# Patient Record
Sex: FEMALE | Race: BLACK OR AFRICAN AMERICAN | NOT HISPANIC OR LATINO | Employment: UNEMPLOYED | URBAN - METROPOLITAN AREA
[De-identification: names, ages, dates, MRNs, and addresses within clinical notes are randomized per-mention and may not be internally consistent; named-entity substitution may affect disease eponyms.]

---

## 2019-01-30 ENCOUNTER — HOSPITAL ENCOUNTER (EMERGENCY)
Facility: HOSPITAL | Age: 54
Discharge: HOME | End: 2019-01-30
Attending: EMERGENCY MEDICINE
Payer: MEDICARE

## 2019-01-30 VITALS
WEIGHT: 185 LBS | DIASTOLIC BLOOD PRESSURE: 87 MMHG | HEART RATE: 116 BPM | RESPIRATION RATE: 21 BRPM | OXYGEN SATURATION: 97 % | SYSTOLIC BLOOD PRESSURE: 163 MMHG | TEMPERATURE: 100 F

## 2019-01-30 DIAGNOSIS — R05.9 COUGH: ICD-10-CM

## 2019-01-30 DIAGNOSIS — J40 BRONCHITIS: Primary | ICD-10-CM

## 2019-01-30 DIAGNOSIS — R07.9 CHEST PAIN: ICD-10-CM

## 2019-01-30 PROCEDURE — 93010 EKG 12-LEAD: ICD-10-PCS | Mod: ,,, | Performed by: INTERNAL MEDICINE

## 2019-01-30 PROCEDURE — 93010 ELECTROCARDIOGRAM REPORT: CPT | Mod: ,,, | Performed by: INTERNAL MEDICINE

## 2019-01-30 PROCEDURE — 93005 ELECTROCARDIOGRAM TRACING: CPT | Mod: ER

## 2019-01-30 PROCEDURE — 99285 EMERGENCY DEPT VISIT HI MDM: CPT | Mod: 25,ER

## 2019-01-30 PROCEDURE — 63600175 PHARM REV CODE 636 W HCPCS: Mod: ER | Performed by: EMERGENCY MEDICINE

## 2019-01-30 PROCEDURE — 25000242 PHARM REV CODE 250 ALT 637 W/ HCPCS: Mod: ER

## 2019-01-30 PROCEDURE — 25000242 PHARM REV CODE 250 ALT 637 W/ HCPCS: Mod: ER | Performed by: EMERGENCY MEDICINE

## 2019-01-30 PROCEDURE — 94760 N-INVAS EAR/PLS OXIMETRY 1: CPT | Mod: ER

## 2019-01-30 PROCEDURE — 94640 AIRWAY INHALATION TREATMENT: CPT | Mod: ER

## 2019-01-30 RX ORDER — PREDNISONE 10 MG/1
10 TABLET ORAL DAILY
Qty: 5 TABLET | Refills: 0 | Status: SHIPPED | OUTPATIENT
Start: 2019-01-30 | End: 2019-02-04

## 2019-01-30 RX ORDER — IPRATROPIUM BROMIDE AND ALBUTEROL SULFATE 2.5; .5 MG/3ML; MG/3ML
SOLUTION RESPIRATORY (INHALATION)
Status: COMPLETED
Start: 2019-01-30 | End: 2019-01-30

## 2019-01-30 RX ORDER — PREDNISONE 20 MG/1
60 TABLET ORAL
Status: COMPLETED | OUTPATIENT
Start: 2019-01-30 | End: 2019-01-30

## 2019-01-30 RX ORDER — IPRATROPIUM BROMIDE AND ALBUTEROL SULFATE 2.5; .5 MG/3ML; MG/3ML
3 SOLUTION RESPIRATORY (INHALATION)
Status: COMPLETED | OUTPATIENT
Start: 2019-01-30 | End: 2019-01-30

## 2019-01-30 RX ORDER — ALBUTEROL SULFATE 90 UG/1
2 AEROSOL, METERED RESPIRATORY (INHALATION) EVERY 6 HOURS PRN
Qty: 6.7 G | Refills: 0 | Status: SHIPPED | OUTPATIENT
Start: 2019-01-30 | End: 2021-06-16 | Stop reason: SDUPTHER

## 2019-01-30 RX ADMIN — IPRATROPIUM BROMIDE AND ALBUTEROL SULFATE 3 ML: .5; 3 SOLUTION RESPIRATORY (INHALATION) at 10:01

## 2019-01-30 RX ADMIN — PREDNISONE 60 MG: 20 TABLET ORAL at 11:01

## 2019-01-31 NOTE — ED PROVIDER NOTES
Encounter Date: 2019    SCRIBE #1 NOTE: I, Khai Jeffries, am scribing for, and in the presence of,  Dr. Caban . I have scribed the following portions of the note - Other sections scribed: HPI, ROS, PE.       History     Chief Complaint   Patient presents with    Chest Pain     Anterior chest pain that radiates to back, shortness of breath with cough onset yesterday. Worsened with lying supine., pt states she been out of BP meds x 1 month, unknown med     This is a 53 year old female complaining of anterior chest pain radiating to back. Patient is experiencing SOB and productive cough. Coughing exacerbates chest pain and abdominal pain. Symptoms worsen when she is lying supine. Patient reports being out of blood pressure meds x 1 month. Patient is a smoker and has no history of bronchitis. She admits to taking breathing treatments at home.       The history is provided by the patient. No  was used.     Review of patient's allergies indicates:  Allergies not on file  Past Medical History:   Diagnosis Date    Hypertension      Past Surgical History:   Procedure Laterality Date     SECTION       No family history on file.  Social History     Tobacco Use    Smoking status: Current Every Day Smoker     Types: Cigarettes   Substance Use Topics    Alcohol use: Yes    Drug use: No     Review of Systems   Constitutional: Positive for fever (subjective).   HENT: Negative.  Negative for sore throat.    Eyes: Negative.    Respiratory: Positive for cough and shortness of breath.    Cardiovascular: Positive for chest pain.   Gastrointestinal: Positive for abdominal pain (due to cough ). Negative for nausea and vomiting.   Endocrine: Negative.    Genitourinary: Negative.  Negative for dysuria.   Musculoskeletal: Negative.  Negative for myalgias.   Skin: Negative.  Negative for rash.   Allergic/Immunologic: Negative.    Neurological: Negative.  Negative for headaches.   Hematological:  Negative.  Negative for adenopathy.   Psychiatric/Behavioral: Negative.  Negative for behavioral problems.   All other systems reviewed and are negative.      Physical Exam     Initial Vitals [01/30/19 2141]   BP Pulse Resp Temp SpO2   (!) 177/103 (!) 114 18 99.9 °F (37.7 °C) (!) 92 %      MAP       --         Physical Exam    Nursing note and vitals reviewed.  Constitutional: She appears well-developed and well-nourished.   HENT:   Head: Normocephalic and atraumatic.   Right Ear: External ear normal.   Left Ear: External ear normal.   Nose: Nose normal.   Eyes: Conjunctivae are normal.   Neck: Normal range of motion. Neck supple.   Cardiovascular: Normal rate and intact distal pulses.   Pulmonary/Chest: Effort normal. No respiratory distress. She has wheezes in the right upper field, the right middle field, the right lower field, the left upper field, the left middle field and the left lower field.   Abdominal: Soft. There is no tenderness.   Musculoskeletal: Normal range of motion.   Neurological: She is alert and oriented to person, place, and time.   Skin: Skin is warm and dry. Capillary refill takes less than 2 seconds.   Psychiatric: She has a normal mood and affect. Her behavior is normal.         ED Course   Procedures  Labs Reviewed - No data to display       Imaging Results          X-Ray Chest AP Portable (Final result)  Result time 01/30/19 22:05:51    Final result by Ho Velarde MD (01/30/19 22:05:51)                 Impression:      No acute findings in the chest.      Electronically signed by: Ho Velarde MD  Date:    01/30/2019  Time:    22:05             Narrative:    EXAMINATION:  XR CHEST AP PORTABLE    CLINICAL HISTORY:  Cough    TECHNIQUE:  Single frontal view of the chest was performed.    COMPARISON:  None    FINDINGS:  No consolidation, pleural effusion or pneumothorax.    Cardiomediastinal silhouette is unremarkable.                                 Medical Decision Making:   ED  Management:  Markedly improved with good air movement and almost complete resolution of wheezing with 3 treatments and steroids.            Scribe Attestation:   Scribe #1: I performed the above scribed service and the documentation accurately describes the services I performed. I attest to the accuracy of the note.    This document was produced by a scribe under my direction and in my presence. I agree with the content of the note and have made any necessary edits.     Mj Caban MD    01/30/2019 10:47 PM           Clinical Impression:     1. Bronchitis    2. Chest pain    3. Cough                                   Mj Caban MD  01/30/19 2247       Mj Caban MD  01/30/19 2243

## 2019-11-19 ENCOUNTER — HOSPITAL ENCOUNTER (OUTPATIENT)
Facility: HOSPITAL | Age: 54
Discharge: HOME | End: 2019-11-20
Attending: EMERGENCY MEDICINE | Admitting: INTERNAL MEDICINE
Payer: MEDICARE

## 2019-11-19 DIAGNOSIS — I10 ESSENTIAL HYPERTENSION: ICD-10-CM

## 2019-11-19 DIAGNOSIS — E87.6 HYPOKALEMIA: ICD-10-CM

## 2019-11-19 DIAGNOSIS — R07.9 CHEST PAIN: ICD-10-CM

## 2019-11-19 DIAGNOSIS — R94.31 PROLONGED Q-T INTERVAL ON ECG: ICD-10-CM

## 2019-11-19 DIAGNOSIS — R07.9 CHEST PAIN, UNSPECIFIED TYPE: Primary | ICD-10-CM

## 2019-11-19 LAB
ALBUMIN SERPL-MCNC: 3.9 G/DL (ref 3.3–5.5)
ALP SERPL-CCNC: 85 U/L (ref 42–141)
BILIRUB SERPL-MCNC: 0.5 MG/DL (ref 0.2–1.6)
BUN SERPL-MCNC: 5 MG/DL (ref 7–22)
CALCIUM SERPL-MCNC: 9.5 MG/DL (ref 8–10.3)
CHLORIDE SERPL-SCNC: 104 MMOL/L (ref 98–108)
CREAT SERPL-MCNC: 0.7 MG/DL (ref 0.6–1.2)
GLUCOSE SERPL-MCNC: 101 MG/DL (ref 73–118)
POC ALT (SGPT): 14 U/L (ref 10–47)
POC AST (SGOT): 24 U/L (ref 11–38)
POC B-TYPE NATRIURETIC PEPTIDE: 9.5 PG/ML (ref 0–100)
POC CARDIAC TROPONIN I: 0 NG/ML
POC D-DI: 222 NG/ML (ref 0–450)
POC TCO2: 27 MMOL/L (ref 18–33)
POTASSIUM BLD-SCNC: 2.8 MMOL/L (ref 3.6–5.1)
PROTEIN, POC: 7.5 G/DL (ref 6.4–8.1)
SAMPLE: NORMAL
SODIUM BLD-SCNC: 143 MMOL/L (ref 128–145)

## 2019-11-19 PROCEDURE — 85025 COMPLETE CBC W/AUTO DIFF WBC: CPT | Mod: ER

## 2019-11-19 PROCEDURE — 83880 ASSAY OF NATRIURETIC PEPTIDE: CPT | Mod: ER

## 2019-11-19 PROCEDURE — G0378 HOSPITAL OBSERVATION PER HR: HCPCS

## 2019-11-19 PROCEDURE — 84484 ASSAY OF TROPONIN QUANT: CPT | Mod: ER

## 2019-11-19 PROCEDURE — 96365 THER/PROPH/DIAG IV INF INIT: CPT | Mod: ER

## 2019-11-19 PROCEDURE — 85379 FIBRIN DEGRADATION QUANT: CPT | Mod: ER

## 2019-11-19 PROCEDURE — 93005 ELECTROCARDIOGRAM TRACING: CPT | Mod: ER

## 2019-11-19 PROCEDURE — 25000003 PHARM REV CODE 250: Mod: ER | Performed by: EMERGENCY MEDICINE

## 2019-11-19 PROCEDURE — 96375 TX/PRO/DX INJ NEW DRUG ADDON: CPT | Mod: ER

## 2019-11-19 PROCEDURE — 99285 EMERGENCY DEPT VISIT HI MDM: CPT | Mod: 25,ER

## 2019-11-19 PROCEDURE — 80053 COMPREHEN METABOLIC PANEL: CPT | Mod: ER

## 2019-11-19 PROCEDURE — 93010 EKG 12-LEAD: ICD-10-PCS | Mod: ,,, | Performed by: INTERNAL MEDICINE

## 2019-11-19 PROCEDURE — 63600175 PHARM REV CODE 636 W HCPCS: Mod: ER | Performed by: EMERGENCY MEDICINE

## 2019-11-19 PROCEDURE — 93010 ELECTROCARDIOGRAM REPORT: CPT | Mod: ,,, | Performed by: INTERNAL MEDICINE

## 2019-11-19 RX ORDER — SODIUM CHLORIDE 9 MG/ML
1000 INJECTION, SOLUTION INTRAVENOUS
Status: COMPLETED | OUTPATIENT
Start: 2019-11-19 | End: 2019-11-19

## 2019-11-19 RX ORDER — POTASSIUM CHLORIDE 7.45 MG/ML
10 INJECTION INTRAVENOUS
Status: COMPLETED | OUTPATIENT
Start: 2019-11-19 | End: 2019-11-19

## 2019-11-19 RX ORDER — ASPIRIN 325 MG
325 TABLET ORAL
Status: COMPLETED | OUTPATIENT
Start: 2019-11-19 | End: 2019-11-19

## 2019-11-19 RX ORDER — POTASSIUM CHLORIDE 20 MEQ/1
40 TABLET, EXTENDED RELEASE ORAL
Status: COMPLETED | OUTPATIENT
Start: 2019-11-19 | End: 2019-11-19

## 2019-11-19 RX ORDER — MAGNESIUM SULFATE 1 G/100ML
1 INJECTION INTRAVENOUS
Status: COMPLETED | OUTPATIENT
Start: 2019-11-19 | End: 2019-11-19

## 2019-11-19 RX ORDER — DIAZEPAM 5 MG/1
5 TABLET ORAL
Status: COMPLETED | OUTPATIENT
Start: 2019-11-19 | End: 2019-11-19

## 2019-11-19 RX ORDER — SODIUM CHLORIDE 0.9 % (FLUSH) 0.9 %
10 SYRINGE (ML) INJECTION
Status: DISCONTINUED | OUTPATIENT
Start: 2019-11-19 | End: 2019-11-20 | Stop reason: HOSPADM

## 2019-11-19 RX ADMIN — DIAZEPAM 5 MG: 5 TABLET ORAL at 08:11

## 2019-11-19 RX ADMIN — ASPIRIN 325 MG ORAL TABLET 325 MG: 325 PILL ORAL at 08:11

## 2019-11-19 RX ADMIN — POTASSIUM CHLORIDE 40 MEQ: 1500 TABLET, EXTENDED RELEASE ORAL at 08:11

## 2019-11-19 RX ADMIN — MAGNESIUM SULFATE IN DEXTROSE 1 G: 10 INJECTION, SOLUTION INTRAVENOUS at 09:11

## 2019-11-19 RX ADMIN — POTASSIUM CHLORIDE 10 MEQ: 7.46 INJECTION, SOLUTION INTRAVENOUS at 08:11

## 2019-11-19 RX ADMIN — SODIUM CHLORIDE 1000 ML: 0.9 INJECTION, SOLUTION INTRAVENOUS at 08:11

## 2019-11-20 VITALS
OXYGEN SATURATION: 98 % | BODY MASS INDEX: 33 KG/M2 | RESPIRATION RATE: 18 BRPM | DIASTOLIC BLOOD PRESSURE: 84 MMHG | HEART RATE: 78 BPM | HEIGHT: 64 IN | WEIGHT: 193.31 LBS | TEMPERATURE: 99 F | SYSTOLIC BLOOD PRESSURE: 148 MMHG

## 2019-11-20 PROBLEM — I10 ESSENTIAL HYPERTENSION: Status: ACTIVE | Noted: 2019-11-20

## 2019-11-20 PROBLEM — R94.31 PROLONGED Q-T INTERVAL ON ECG: Status: ACTIVE | Noted: 2019-11-20

## 2019-11-20 LAB
ANION GAP SERPL CALC-SCNC: 14 MMOL/L (ref 8–16)
ASCENDING AORTA: 3.03 CM
AV INDEX (PROSTH): 0.99
AV MEAN GRADIENT: 4 MMHG
AV PEAK GRADIENT: 7 MMHG
AV VALVE AREA: 4.28 CM2
AV VELOCITY RATIO: 0.88
BSA FOR ECHO PROCEDURE: 1.99 M2
BUN SERPL-MCNC: 5 MG/DL (ref 6–20)
CALCIUM SERPL-MCNC: 8.7 MG/DL (ref 8.7–10.5)
CHLORIDE SERPL-SCNC: 108 MMOL/L (ref 95–110)
CO2 SERPL-SCNC: 21 MMOL/L (ref 23–29)
CREAT SERPL-MCNC: 0.7 MG/DL (ref 0.5–1.4)
CV ECHO LV RWT: 0.47 CM
CV STRESS BASE HR: 68 BPM
DIASTOLIC BLOOD PRESSURE: 95 MMHG
DOP CALC AO PEAK VEL: 1.32 M/S
DOP CALC AO VTI: 28.41 CM
DOP CALC LVOT AREA: 4.3 CM2
DOP CALC LVOT DIAMETER: 2.35 CM
DOP CALC LVOT PEAK VEL: 1.16 M/S
DOP CALC LVOT STROKE VOLUME: 121.6 CM3
DOP CALCLVOT PEAK VEL VTI: 28.05 CM
E WAVE DECELERATION TIME: 197.24 MSEC
E/A RATIO: 1.15
E/E' RATIO: 13.38 M/S
ECHO LV POSTERIOR WALL: 1.18 CM (ref 0.6–1.1)
EST. GFR  (AFRICAN AMERICAN): >60 ML/MIN/1.73 M^2
EST. GFR  (NON AFRICAN AMERICAN): >60 ML/MIN/1.73 M^2
FRACTIONAL SHORTENING: 33 % (ref 28–44)
GLUCOSE SERPL-MCNC: 111 MG/DL (ref 70–110)
INTERVENTRICULAR SEPTUM: 1.44 CM (ref 0.6–1.1)
IVRT: 0.1 MSEC
LA MAJOR: 4.25 CM
LA MINOR: 4.63 CM
LA WIDTH: 4.33 CM
LEFT ATRIUM SIZE: 3.58 CM
LEFT ATRIUM VOLUME INDEX: 30.3 ML/M2
LEFT ATRIUM VOLUME: 58.4 CM3
LEFT INTERNAL DIMENSION IN SYSTOLE: 3.38 CM (ref 2.1–4)
LEFT VENTRICLE DIASTOLIC VOLUME INDEX: 63.44 ML/M2
LEFT VENTRICLE DIASTOLIC VOLUME: 122.31 ML
LEFT VENTRICLE MASS INDEX: 140 G/M2
LEFT VENTRICLE SYSTOLIC VOLUME INDEX: 24.3 ML/M2
LEFT VENTRICLE SYSTOLIC VOLUME: 46.89 ML
LEFT VENTRICULAR INTERNAL DIMENSION IN DIASTOLE: 5.07 CM (ref 3.5–6)
LEFT VENTRICULAR MASS: 270.53 G
LV LATERAL E/E' RATIO: 11.89 M/S
LV SEPTAL E/E' RATIO: 15.29 M/S
MV PEAK A VEL: 0.93 M/S
MV PEAK E VEL: 1.07 M/S
NUC STRESS EJECTION FRACTION: 73 %
OHS CV CPX 85 PERCENT MAX PREDICTED HEART RATE MALE: 135
OHS CV CPX MAX PREDICTED HEART RATE: 158
OHS CV CPX PATIENT IS FEMALE: 1
OHS CV CPX PATIENT IS MALE: 0
OHS CV CPX PEAK DIASTOLIC BLOOD PRESSURE: 93 MMHG
OHS CV CPX PEAK HEAR RATE: 115 BPM
OHS CV CPX PEAK RATE PRESSURE PRODUCT: NORMAL
OHS CV CPX PEAK SYSTOLIC BLOOD PRESSURE: 161 MMHG
OHS CV CPX PERCENT MAX PREDICTED HEART RATE ACHIEVED: 73
OHS CV CPX RATE PRESSURE PRODUCT PRESENTING: 9588
PISA TR MAX VEL: 2.78 M/S
POTASSIUM SERPL-SCNC: 2.8 MMOL/L (ref 3.5–5.1)
POTASSIUM SERPL-SCNC: 3 MMOL/L (ref 3.5–5.1)
RA MAJOR: 3.86 CM
RA WIDTH: 3.5 CM
RIGHT VENTRICULAR END-DIASTOLIC DIMENSION: 2.95 CM
RV TISSUE DOPPLER FREE WALL SYSTOLIC VELOCITY 1 (APICAL 4 CHAMBER VIEW): 12.54 CM/S
SINUS: 3.11 CM
SODIUM SERPL-SCNC: 143 MMOL/L (ref 136–145)
STJ: 2.62 CM
STRESS ECHO TARGET HR: 141 BPM
SYSTOLIC BLOOD PRESSURE: 141 MMHG
TDI LATERAL: 0.09 M/S
TDI SEPTAL: 0.07 M/S
TDI: 0.08 M/S
TR MAX PG: 31 MMHG
TRICUSPID ANNULAR PLANE SYSTOLIC EXCURSION: 2.54 CM
TROPONIN I SERPL DL<=0.01 NG/ML-MCNC: 0.01 NG/ML (ref 0–0.03)
TROPONIN I SERPL DL<=0.01 NG/ML-MCNC: 0.01 NG/ML (ref 0–0.03)

## 2019-11-20 PROCEDURE — 99220 PR INITIAL OBSERVATION CARE,LEVL III: ICD-10-PCS | Mod: 25,,, | Performed by: INTERNAL MEDICINE

## 2019-11-20 PROCEDURE — 94761 N-INVAS EAR/PLS OXIMETRY MLT: CPT

## 2019-11-20 PROCEDURE — 84132 ASSAY OF SERUM POTASSIUM: CPT

## 2019-11-20 PROCEDURE — G0378 HOSPITAL OBSERVATION PER HR: HCPCS

## 2019-11-20 PROCEDURE — 25000003 PHARM REV CODE 250: Performed by: NURSE PRACTITIONER

## 2019-11-20 PROCEDURE — 63600175 PHARM REV CODE 636 W HCPCS: Performed by: INTERNAL MEDICINE

## 2019-11-20 PROCEDURE — 36415 COLL VENOUS BLD VENIPUNCTURE: CPT

## 2019-11-20 PROCEDURE — 80048 BASIC METABOLIC PNL TOTAL CA: CPT

## 2019-11-20 PROCEDURE — 84484 ASSAY OF TROPONIN QUANT: CPT | Mod: 91

## 2019-11-20 PROCEDURE — 99220 PR INITIAL OBSERVATION CARE,LEVL III: CPT | Mod: 25,,, | Performed by: INTERNAL MEDICINE

## 2019-11-20 RX ORDER — AMLODIPINE BESYLATE 10 MG/1
10 TABLET ORAL DAILY
Qty: 30 TABLET | Refills: 6 | Status: SHIPPED | OUTPATIENT
Start: 2019-11-20 | End: 2020-11-19

## 2019-11-20 RX ORDER — POTASSIUM CHLORIDE 20 MEQ/1
40 TABLET, EXTENDED RELEASE ORAL DAILY
Status: DISCONTINUED | OUTPATIENT
Start: 2019-11-20 | End: 2019-11-20 | Stop reason: HOSPADM

## 2019-11-20 RX ORDER — POTASSIUM CHLORIDE 20 MEQ/1
40 TABLET, EXTENDED RELEASE ORAL 2 TIMES DAILY
Qty: 8 TABLET | Refills: 0 | Status: SHIPPED | OUTPATIENT
Start: 2019-11-20 | End: 2019-11-22

## 2019-11-20 RX ORDER — REGADENOSON 0.08 MG/ML
0.4 INJECTION, SOLUTION INTRAVENOUS ONCE
Status: COMPLETED | OUTPATIENT
Start: 2019-11-20 | End: 2019-11-20

## 2019-11-20 RX ADMIN — REGADENOSON 0.4 MG: 0.08 INJECTION, SOLUTION INTRAVENOUS at 11:11

## 2019-11-20 RX ADMIN — POTASSIUM CHLORIDE 40 MEQ: 1500 TABLET, EXTENDED RELEASE ORAL at 04:11

## 2019-11-20 NOTE — ASSESSMENT & PLAN NOTE
Atypical chest pain with risk factors and change in EKG. Cardiology consulted who ordered NST - keep NPO, continue cardiac monitoring and trend trops

## 2019-11-20 NOTE — H&P
"[facility] Hospital Medicine  History & Physical    Patient Name: Jayde Kinney  MRN: [id number]  Admission Date: 2019  Attending Physician: Beatris Blankenship MD   Primary Care Provider: To Obtain Unable         Patient information was obtained from patient and ER records.     Subjective:     Principal Problem:Chest pain    Chief Complaint:   Chief Complaint   Patient presents with    Chest Pain     pt found out her sister , went home and drank a pint of whiskey and one beer, now complaining of chest pain.         HPI: Jayde Kinney is a 53 y.o. AAF with PMHx including HTN, DMII, and cigarette smoker. She presented for evaluation of acute onset of non-radiating chest stabbing-like pain that started when she was crying about her sisters grim condition on life support. She reports that she did not have NTG in ED. She denies fever/chills, NVD, long trips, HA, hormone use, recent trauma, or focal deficits.      Requested that patient call family member to get her mediation names but she stated "NO" says she doen't know the names of her medications.    Past Medical History:   Diagnosis Date    Arthritis     Hypertension        Past Surgical History:   Procedure Laterality Date     SECTION      FRACTURE SURGERY      Left leg        Review of patient's allergies indicates:  No Known Allergies    No current facility-administered medications on file prior to encounter.      Current Outpatient Medications on File Prior to Encounter   Medication Sig    albuterol (PROVENTIL/VENTOLIN HFA) 90 mcg/actuation inhaler Inhale 2 puffs into the lungs every 6 (six) hours as needed for Wheezing. Rescue     Family History     None        Tobacco Use    Smoking status: Current Every Day Smoker     Packs/day: 0.50     Types: Cigarettes    Smokeless tobacco: Never Used   Substance and Sexual Activity    Alcohol use: Yes     Comment: occasionally    Drug use: No    Sexual activity: Not on " file     Review of Systems   Constitutional: Negative for appetite change, chills, diaphoresis and fever.   HENT: Negative for congestion, hearing loss, sore throat, tinnitus and trouble swallowing.    Eyes: Negative for photophobia, discharge, itching and visual disturbance.   Respiratory: Negative for apnea, cough, wheezing and stridor.    Cardiovascular: Positive for chest pain. Negative for palpitations and leg swelling.   Gastrointestinal: Negative for abdominal distention, abdominal pain, blood in stool, constipation, diarrhea and nausea.   Endocrine: Negative for polydipsia, polyphagia and polyuria.   Genitourinary: Negative for difficulty urinating, dysuria, flank pain and frequency.   Musculoskeletal: Negative for arthralgias, joint swelling and neck stiffness.   Skin: Negative for color change, rash and wound.   Neurological: Negative for dizziness, tremors, seizures, light-headedness, numbness and headaches.   Hematological: Negative for adenopathy.   Psychiatric/Behavioral: Negative for hallucinations and self-injury.     Objective:     Vital Signs (Most Recent):  Temp: 98.9 °F (37.2 °C) (11/20/19 1351)  Pulse: 78 (11/20/19 1351)  Resp: 18 (11/20/19 1351)  BP: (!) 148/84 (11/20/19 1351)  SpO2: 98 % (11/20/19 1351) Vital Signs (24h Range):  Temp:  [98 °F (36.7 °C)-98.9 °F (37.2 °C)] 98.9 °F (37.2 °C)  Pulse:  [70-79] 78  Resp:  [17-20] 18  SpO2:  [96 %-100 %] 98 %  BP: (107-178)/() 148/84     Weight: 87.7 kg (193 lb 5.5 oz)  Body mass index is 33.19 kg/m².    Physical Exam   Constitutional: She is oriented to person, place, and time. She appears well-developed and well-nourished. She is cooperative.   HENT:   Head: Normocephalic and atraumatic.   Eyes: Pupils are equal, round, and reactive to light. Conjunctivae, EOM and lids are normal.   Neck: Normal range of motion and full passive range of motion without pain. Neck supple. No JVD present. No edema present. No thyroid mass present.    Cardiovascular: S1 normal, S2 normal and intact distal pulses.   No murmur heard.  Pulmonary/Chest: Effort normal.   Abdominal: Soft. Bowel sounds are normal. She exhibits no distension and no abdominal bruit. There is no splenomegaly or hepatomegaly. There is no tenderness. There is no CVA tenderness.   Musculoskeletal: Normal range of motion.   Lymphadenopathy:     She has no cervical adenopathy.     She has no axillary adenopathy.   Neurological: She is alert and oriented to person, place, and time. She has normal reflexes. She displays no tremor. She displays no seizure activity.   Skin: Skin is warm, dry and intact.   Psychiatric: She has a normal mood and affect. Her speech is normal. Thought content normal. Cognition and memory are normal.         CRANIAL NERVES     CN III, IV, VI   Pupils are equal, round, and reactive to light.  Extraocular motions are normal.        Significant Labs: All pertinent labs within the past 24 hours have been reviewed.    Significant Imaging: I have reviewed all pertinent imaging results/findings within the past 24 hours.    Assessment/Plan:     * Chest pain  Atypical chest pain with risk factors and change in EKG. Cardiology consulted who ordered NST - keep NPO, continue cardiac monitoring and trend trops      Essential hypertension  States that she does not know her medications and will not call family  Amlodipine for BP  Monitor closely      Prolonged Q-T interval on ECG  See above        VTE Risk Mitigation (From admission, onward)         Ordered     IP VTE LOW RISK PATIENT  Once      11/19/19 2350     Place CORI hose  Until discontinued      11/19/19 2350     Place sequential compression device  Until discontinued      11/19/19 2350                   LEANDRO De Los Santos, FNP-C  Hospitalist - Department of Hospital Medicine  [address], [place] [address]  Office [phone]; Pager [phone]

## 2019-11-20 NOTE — SUBJECTIVE & OBJECTIVE
Past Medical History:   Diagnosis Date    Arthritis     Hypertension        Past Surgical History:   Procedure Laterality Date     SECTION      FRACTURE SURGERY      Left leg        Review of patient's allergies indicates:  No Known Allergies    No current facility-administered medications on file prior to encounter.      Current Outpatient Medications on File Prior to Encounter   Medication Sig    albuterol (PROVENTIL/VENTOLIN HFA) 90 mcg/actuation inhaler Inhale 2 puffs into the lungs every 6 (six) hours as needed for Wheezing. Rescue     Family History     None        Tobacco Use    Smoking status: Current Every Day Smoker     Packs/day: 0.50     Types: Cigarettes    Smokeless tobacco: Never Used   Substance and Sexual Activity    Alcohol use: Yes     Comment: occasionally    Drug use: No    Sexual activity: Not on file     Review of Systems   Constitutional: Negative for appetite change, chills, diaphoresis and fever.   HENT: Negative for congestion, hearing loss, sore throat, tinnitus and trouble swallowing.    Eyes: Negative for photophobia, discharge, itching and visual disturbance.   Respiratory: Negative for apnea, cough, wheezing and stridor.    Cardiovascular: Positive for chest pain. Negative for palpitations and leg swelling.   Gastrointestinal: Negative for abdominal distention, abdominal pain, blood in stool, constipation, diarrhea and nausea.   Endocrine: Negative for polydipsia, polyphagia and polyuria.   Genitourinary: Negative for difficulty urinating, dysuria, flank pain and frequency.   Musculoskeletal: Negative for arthralgias, joint swelling and neck stiffness.   Skin: Negative for color change, rash and wound.   Neurological: Negative for dizziness, tremors, seizures, light-headedness, numbness and headaches.   Hematological: Negative for adenopathy.   Psychiatric/Behavioral: Negative for hallucinations and self-injury.     Objective:     Vital Signs (Most Recent):  Temp:  98.9 °F (37.2 °C) (11/20/19 1351)  Pulse: 78 (11/20/19 1351)  Resp: 18 (11/20/19 1351)  BP: (!) 148/84 (11/20/19 1351)  SpO2: 98 % (11/20/19 1351) Vital Signs (24h Range):  Temp:  [98 °F (36.7 °C)-98.9 °F (37.2 °C)] 98.9 °F (37.2 °C)  Pulse:  [70-79] 78  Resp:  [17-20] 18  SpO2:  [96 %-100 %] 98 %  BP: (107-178)/() 148/84     Weight: 87.7 kg (193 lb 5.5 oz)  Body mass index is 33.19 kg/m².    Physical Exam   Constitutional: She is oriented to person, place, and time. She appears well-developed and well-nourished. She is cooperative.   HENT:   Head: Normocephalic and atraumatic.   Eyes: Pupils are equal, round, and reactive to light. Conjunctivae, EOM and lids are normal.   Neck: Normal range of motion and full passive range of motion without pain. Neck supple. No JVD present. No edema present. No thyroid mass present.   Cardiovascular: S1 normal, S2 normal and intact distal pulses.   No murmur heard.  Pulmonary/Chest: Effort normal.   Abdominal: Soft. Bowel sounds are normal. She exhibits no distension and no abdominal bruit. There is no splenomegaly or hepatomegaly. There is no tenderness. There is no CVA tenderness.   Musculoskeletal: Normal range of motion.   Lymphadenopathy:     She has no cervical adenopathy.     She has no axillary adenopathy.   Neurological: She is alert and oriented to person, place, and time. She has normal reflexes. She displays no tremor. She displays no seizure activity.   Skin: Skin is warm, dry and intact.   Psychiatric: She has a normal mood and affect. Her speech is normal. Thought content normal. Cognition and memory are normal.         CRANIAL NERVES     CN III, IV, VI   Pupils are equal, round, and reactive to light.  Extraocular motions are normal.        Significant Labs: All pertinent labs within the past 24 hours have been reviewed.    Significant Imaging: I have reviewed all pertinent imaging results/findings within the past 24 hours.

## 2019-11-20 NOTE — PLAN OF CARE
Problem: Fall Injury Risk  Goal: Absence of Fall and Fall-Related Injury  Outcome: Ongoing, Progressing  Intervention: Identify and Manage Contributors to Fall Injury Risk  Flowsheets (Taken 11/20/2019 1133)  Self-Care Promotion: independence encouraged; BADL personal objects within reach  Medication Review/Management: medications reviewed  Intervention: Promote Injury-Free Environment  Flowsheets (Taken 11/20/2019 1133)  Safety Promotion/Fall Prevention: assistive device/personal item within reach; bed alarm set; commode/urinal/bedpan at bedside; Fall Risk reviewed with patient/family; lighting adjusted; medications reviewed; nonskid shoes/socks when out of bed; room near unit station; side rails raised x 2; instructed to call staff for mobility  Environmental Safety Modification: assistive device/personal items within reach; clutter free environment maintained; lighting adjusted; room organization consistent; room near unit station

## 2019-11-20 NOTE — CONSULTS
[facility]  Cardiology  Consult Note    Patient Name: Jayde Kinney  MRN: [id number]  Admission Date: 2019  Hospital Length of Stay: 0 days  Code Status: Full Code   Attending Provider: Beatris Blankenship MD   Consulting Provider: Raj Harris MD  Primary Care Physician: To Obtain Unable  Principal Problem:<principal problem not specified>    Patient information was obtained from patient and ER records.     Consults  Subjective:     Chief Complaint:  CP     HPI:      53 year old female complaining of acute intermittent chest pain beginning today around 1800. Chest pain can be described as pressure like. Reports mild SOB and heart palpitation during episodes of chest pain. Patient has been under a lot of stress lately due to sick family member. Denies PMHX of stroke or MI. Patient is a smoker. Patient does not have PCP.    Currently CP free  Troponin negative x 2  EKG NSR PRWP QTc 512  Denies prior CAD    Past Medical History:   Diagnosis Date    Arthritis     Hypertension        Past Surgical History:   Procedure Laterality Date     SECTION      FRACTURE SURGERY      Left leg        Review of patient's allergies indicates:  No Known Allergies    No current facility-administered medications on file prior to encounter.      Current Outpatient Medications on File Prior to Encounter   Medication Sig    albuterol (PROVENTIL/VENTOLIN HFA) 90 mcg/actuation inhaler Inhale 2 puffs into the lungs every 6 (six) hours as needed for Wheezing. Rescue     Family History     None        Tobacco Use    Smoking status: Current Every Day Smoker     Packs/day: 0.50     Types: Cigarettes    Smokeless tobacco: Never Used   Substance and Sexual Activity    Alcohol use: Yes     Comment: occasionally    Drug use: No    Sexual activity: Not on file     Review of Systems   Constitution: Negative for decreased appetite.   HENT: Negative for ear discharge.    Eyes: Negative for blurred vision.    Respiratory: Negative for hemoptysis.    Endocrine: Negative for polyphagia.   Hematologic/Lymphatic: Negative for adenopathy.   Skin: Negative for color change.   Musculoskeletal: Negative for joint swelling.   Genitourinary: Negative for bladder incontinence.   Neurological: Negative for brief paralysis.   Psychiatric/Behavioral: Negative for hallucinations.   Allergic/Immunologic: Negative for hives.     Objective:     Vital Signs (Most Recent):  Temp: 98 °F (36.7 °C) (11/20/19 0727)  Pulse: 76 (11/20/19 0727)  Resp: 18 (11/20/19 0727)  BP: 132/72 (11/20/19 0853)  SpO2: 96 % (11/20/19 0727) Vital Signs (24h Range):  Temp:  [98 °F (36.7 °C)-98.5 °F (36.9 °C)] 98 °F (36.7 °C)  Pulse:  [70-79] 76  Resp:  [17-20] 18  SpO2:  [96 %-100 %] 96 %  BP: (107-178)/() 132/72     Weight: 87.7 kg (193 lb 5.5 oz)  Body mass index is 33.19 kg/m².    SpO2: 96 %  O2 Device (Oxygen Therapy): room air      Intake/Output Summary (Last 24 hours) at 11/20/2019 0942  Last data filed at 11/20/2019 0016  Gross per 24 hour   Intake 1440 ml   Output 0 ml   Net 1440 ml       Lines/Drains/Airways     Peripheral Intravenous Line                 Peripheral IV - Single Lumen 11/19/19 1946 18 G Right Antecubital less than 1 day                Physical Exam   Constitutional: She is oriented to person, place, and time. She appears well-developed and well-nourished.   HENT:   Head: Normocephalic and atraumatic.   Eyes: Pupils are equal, round, and reactive to light. Conjunctivae are normal.   Neck: Normal range of motion. Neck supple.   Cardiovascular: Normal rate, normal heart sounds and intact distal pulses.   Pulmonary/Chest: Effort normal and breath sounds normal.   Abdominal: Soft. Bowel sounds are normal.   Musculoskeletal: Normal range of motion.   Neurological: She is alert and oriented to person, place, and time.   Skin: Skin is warm and dry.       Significant Labs: All pertinent lab results from the last 24 hours have been  reviewed.    Significant Imaging: Echocardiogram: 2D echo with color flow doppler: No results found for this or any previous visit.    Assessment and Plan:     Essential hypertension  BP labile - will monitor    Prolonged Q-T interval on ECG  Agree with IV magnesium    Chest pain  Ruled out for MI. EKG with PRWP - suspect related to lead position. Echo and stress test today - ok for d/c if negative for ischemia        VTE Risk Mitigation (From admission, onward)         Ordered     IP VTE LOW RISK PATIENT  Once      11/19/19 2350     Place CORI hose  Until discontinued      11/19/19 2350     Place sequential compression device  Until discontinued      11/19/19 2350                Thank you for your consult. I will follow-up with patient. Please contact us if you have any additional questions.    Raj Harris MD  Cardiology   [facility]

## 2019-11-20 NOTE — NURSING TRANSFER
Nursing Transfer Note      11/20/2019     Transfer From: [facility] ED To: [facility]    Transfer via EMS stretcher    Transfer with cardiac monitoring    Transported by [employer] transport    Medicines sent: none    Chart send with patient: Yes    Notified: family    Patient reassessed at 0000 on November 20, 2019    Upon arrival to floor EMS placed stretcher at bedside. Patient up from stretcher and assisted to bed by EMS personnel. Nurse applied cardiac monitoring. Vital signs obtained and found in flow sheets with complete patient assessment. Skin dry and flaky but intact with a 18g RAC PIV noted saline locked. No complaints of pain or signs of respiratory distress at this time. Plan to trend troponins, monitor and manage pain, and maintain npo status after midnight for possible cardiac testing in the am. Patient updated on plan of care and verbalized understanding. Call light in reach and patient instructed to inform the nurse if anything is needed. Patient stable and will continue to be monitored.

## 2019-11-20 NOTE — PLAN OF CARE
11/20/19 1437   Final Note   Anticipated Discharge Disposition Home   Hospital Follow Up  Appt(s) scheduled? Yes   Discharge plans and expectations educations in teach back method with documentation complete? Yes   Right Care Referral Info   Post Acute Recommendation No Care   pts nurse Oma notified that the pt can d/c from CM standpoint.

## 2019-11-20 NOTE — PLAN OF CARE
11/20/19 1302   Discharge Assessment   Assessment Type Discharge Planning Assessment   Assessment information obtained from? Medical Record   Prior to hospitilization cognitive status: Alert/Oriented   Prior to hospitalization functional status: Independent   Current cognitive status: Alert/Oriented   Current Functional Status: Independent   Lives With other (see comments)   Able to Return to Prior Arrangements yes   Is patient able to care for self after discharge? Yes   Who are your caregiver(s) and their phone number(s)? [facility]- [phone]   Patient's perception of discharge disposition home or selfcare   Readmission Within the Last 30 Days no previous admission in last 30 days   Patient currently being followed by outpatient case management? No   Patient currently receives any other outside agency services? No   Equipment Currently Used at Home cane, straight;rollator   Do you have any problems affording any of your prescribed medications? No   Is the patient taking medications as prescribed? yes   Does the patient have transportation home? Yes   Transportation Anticipated family or friend will provide   Does the patient receive services at the [facility]? No   Discharge Plan A Home with family   DME Needed Upon Discharge  none   Patient/Family in Agreement with Plan yes

## 2019-11-20 NOTE — ED NOTES
Report called to Karen at [facility] in sbar format with opportunity for questions

## 2019-11-20 NOTE — NURSING
Report received from ERICA Danielson. Patient resting comfortably, no complaints, no acute distress noted. Plan of care reviewed with patient. Will continue to monitor.

## 2019-11-20 NOTE — PROGRESS NOTES
WRITTEN HEALTHCARE DISCHARGE INFORMATION      Things that YOU are RESPONSIBLE for to Manage Your Care At Home:     1. Getting your prescriptions filled.  2. Taking you medications as directed. DO NOT MISS ANY DOSES!  3. Going to your follow-up doctor appointments. This is important because it allows the doctor to monitor your progress and to determine if any changes need to be made to your treatment plan.     If you are unable to make your follow up appointments, please call the number listed and reschedule this appointment.      ____________HELP AT HOME____________________     Experiencing any SIGNS or SYMPTOMS: YOU CAN     Schedule a same day appopintment with your Primary Care Doctor or  you can call [facility] On [facility] Line for 24/7 assistance at [phone]     If you are experience any signs or symptoms that have become severe, Call 911 and come to your nearest Emergency Room.     Thank you for choosing [facility] and allowing us to care for you.   From your care management team:      You should receive a call from [facility] within 48-72 hours to help manage your care after discharge. Please try to make sure that you answer your phone for this important phone call.    Follow-up Information     Raj Harris MD On 12/19/2019.    Specialty:  Cardiology  Why:  Appointment scheduled for December 19th at 10:45am  Contact information:  [address] [facility]  [address]  [place] [address]  [phone]             Krystina Sainz MD On 11/27/2019.    Specialty:  Family Medicine  Why:  Appointment scheduled for November 27th at 9:45am  Contact information:  [address] [place] [address]  [phone]

## 2019-11-20 NOTE — ASSESSMENT & PLAN NOTE
Ruled out for MI. EKG with PRWP - suspect related to lead position. Echo and stress test today - ok for d/c if negative for ischemia

## 2019-11-20 NOTE — HPI
53 year old female complaining of acute intermittent chest pain beginning today around 1800. Chest pain can be described as pressure like. Reports mild SOB and heart palpitation during episodes of chest pain. Patient has been under a lot of stress lately due to sick family member. Denies PMHX of stroke or MI. Patient is a smoker. Patient does not have PCP.    Currently CP free  Troponin negative x 2  EKG NSR PRWP QTc 512  Denies prior CAD

## 2019-11-20 NOTE — HPI
"Jayde Kinney is a 53 y.o. AAF with PMHx including HTN, DMII, and cigarette smoker. She presented for evaluation of acute onset of non-radiating chest stabbing-like pain that started when she was crying about her sisters grim condition on life support. She reports that she did not have NTG in ED. She denies fever/chills, NVD, long trips, HA, hormone use, recent trauma, or focal deficits.      Requested that patient call family member to get her mediation names but she stated "NO" says she doen't know the names of her medications.  "

## 2019-11-20 NOTE — SUBJECTIVE & OBJECTIVE
Past Medical History:   Diagnosis Date    Arthritis     Hypertension        Past Surgical History:   Procedure Laterality Date     SECTION      FRACTURE SURGERY      Left leg        Review of patient's allergies indicates:  No Known Allergies    No current facility-administered medications on file prior to encounter.      Current Outpatient Medications on File Prior to Encounter   Medication Sig    albuterol (PROVENTIL/VENTOLIN HFA) 90 mcg/actuation inhaler Inhale 2 puffs into the lungs every 6 (six) hours as needed for Wheezing. Rescue     Family History     None        Tobacco Use    Smoking status: Current Every Day Smoker     Packs/day: 0.50     Types: Cigarettes    Smokeless tobacco: Never Used   Substance and Sexual Activity    Alcohol use: Yes     Comment: occasionally    Drug use: No    Sexual activity: Not on file     Review of Systems   Constitution: Negative for decreased appetite.   HENT: Negative for ear discharge.    Eyes: Negative for blurred vision.   Respiratory: Negative for hemoptysis.    Endocrine: Negative for polyphagia.   Hematologic/Lymphatic: Negative for adenopathy.   Skin: Negative for color change.   Musculoskeletal: Negative for joint swelling.   Genitourinary: Negative for bladder incontinence.   Neurological: Negative for brief paralysis.   Psychiatric/Behavioral: Negative for hallucinations.   Allergic/Immunologic: Negative for hives.     Objective:     Vital Signs (Most Recent):  Temp: 98 °F (36.7 °C) (19)  Pulse: 76 (19)  Resp: 18 (19)  BP: 132/72 (19 0853)  SpO2: 96 % (19) Vital Signs (24h Range):  Temp:  [98 °F (36.7 °C)-98.5 °F (36.9 °C)] 98 °F (36.7 °C)  Pulse:  [70-79] 76  Resp:  [17-20] 18  SpO2:  [96 %-100 %] 96 %  BP: (107-178)/() 132/72     Weight: 87.7 kg (193 lb 5.5 oz)  Body mass index is 33.19 kg/m².    SpO2: 96 %  O2 Device (Oxygen Therapy): room air      Intake/Output Summary (Last 24 hours) at  11/20/2019 0942  Last data filed at 11/20/2019 0016  Gross per 24 hour   Intake 1440 ml   Output 0 ml   Net 1440 ml       Lines/Drains/Airways     Peripheral Intravenous Line                 Peripheral IV - Single Lumen 11/19/19 1946 18 G Right Antecubital less than 1 day                Physical Exam   Constitutional: She is oriented to person, place, and time. She appears well-developed and well-nourished.   HENT:   Head: Normocephalic and atraumatic.   Eyes: Pupils are equal, round, and reactive to light. Conjunctivae are normal.   Neck: Normal range of motion. Neck supple.   Cardiovascular: Normal rate, normal heart sounds and intact distal pulses.   Pulmonary/Chest: Effort normal and breath sounds normal.   Abdominal: Soft. Bowel sounds are normal.   Musculoskeletal: Normal range of motion.   Neurological: She is alert and oriented to person, place, and time.   Skin: Skin is warm and dry.       Significant Labs: All pertinent lab results from the last 24 hours have been reviewed.    Significant Imaging: Echocardiogram: 2D echo with color flow doppler: No results found for this or any previous visit.

## 2019-11-20 NOTE — ED PROVIDER NOTES
Encounter Date: 2019    SCRIBE #1 NOTE: I, Khai Jeffries, am scribing for, and in the presence of,  Dr. Fernandes. I have scribed the following portions of the note - Other sections scribed: HPI, ROS, PE.       History     Chief Complaint   Patient presents with    Chest Pain     pt found out her sister , went home and drank a pint of whiskey and one beer, now complaining of chest pain.      53 year old female complaining of acute intermittent chest pain beginning today around 1800. Chest pain can be described as pressure like. Reports mild SOB and heart palpitation during episodes of chest pain. Patient has been under a lot of stress lately due to sick family member. Denies PMHX of stroke or MI. Patient is a smoker. Patient does not have PCP.    The history is provided by the patient. No  was used.     Review of patient's allergies indicates:  No Known Allergies  Past Medical History:   Diagnosis Date    Hypertension      Past Surgical History:   Procedure Laterality Date     SECTION       No family history on file.  Social History     Tobacco Use    Smoking status: Current Every Day Smoker     Types: Cigarettes   Substance Use Topics    Alcohol use: Yes    Drug use: No     Review of Systems   Constitutional: Negative for fever.   Respiratory: Positive for shortness of breath. Negative for cough and wheezing.    Cardiovascular: Positive for chest pain and palpitations. Negative for leg swelling.   Gastrointestinal: Negative for nausea and vomiting.   Musculoskeletal:        Chronic left leg with history of walker. Patient reports going to physical therapy. No acute pain   Neurological: Negative for dizziness and syncope.   All other systems reviewed and are negative.      Physical Exam     Initial Vitals [19 1918]   BP Pulse Resp Temp SpO2   (!) 143/83 78 18 98.5 °F (36.9 °C) 100 %      MAP       --         Physical Exam    Nursing note and vitals  reviewed.  Constitutional: She appears well-developed and well-nourished. She is not diaphoretic. No distress.   HENT:   Head: Normocephalic and atraumatic.   Mouth/Throat: Oropharynx is clear and moist.   Eyes: Conjunctivae are normal.   Neck: Normal range of motion and phonation normal. Neck supple. No stridor present.   Cardiovascular: Normal rate, intact distal pulses and normal pulses.   Pulses:       Dorsalis pedis pulses are 2+ on the right side, and 2+ on the left side.   Pulmonary/Chest: Effort normal. No stridor. No respiratory distress.   Musculoskeletal: Normal range of motion. She exhibits no edema or tenderness.   Neurological: She is alert and oriented to person, place, and time.   Skin: Skin is warm and dry.   Psychiatric: She has a normal mood and affect.         ED Course   Critical Care  Date/Time: 11/19/2019 8:36 PM  Performed by: Yadira Fernandes MD  Authorized by: Beatris Blankenship MD   Direct patient critical care time: 10 minutes  Ordering / reviewing critical care time: 10 minutes  Documentation critical care time: 10 minutes  Consulting other physicians critical care time: 10 minutes  Total critical care time (exclusive of procedural time) : 40 minutes  Critical care was necessary to treat or prevent imminent or life-threatening deterioration of the following conditions: circulatory failure.  Critical care was time spent personally by me on the following activities: development of treatment plan with patient or surrogate, discussions with consultants, evaluation of patient's response to treatment, pulse oximetry, re-evaluation of patient's condition, ordering and review of radiographic studies, ordering and performing treatments and interventions, examination of patient, ordering and review of laboratory studies and obtaining history from patient or surrogate.        Labs Reviewed   POCT CMP - Abnormal; Notable for the following components:       Result Value    POC BUN 5 (*)     POC  Potassium 2.8 (*)     All other components within normal limits   TROPONIN ISTAT   POCT CBC   POCT CMP   POCT TROPONIN   POCT B-TYPE NATRIURETIC PEPTIDE (BNP)   POCT D DIMER   POCT B-TYPE NATRIURETIC PEPTIDE (BNP)   POCT D DIMER     EKG Readings: (Independently Interpreted)   Initial Reading: No STEMI. Rhythm: Normal Sinus Rhythm. Heart Rate: 74 bpm. Ectopy: No Ectopy. Conduction: Normal. ST Segments: Normal ST Segments. T Waves: Normal. Clinical Impression: Normal Sinus Rhythm Other Impression: Prolonged QT interval.       Imaging Results          X-Ray Chest PA And Lateral (Final result)  Result time 11/19/19 19:41:19    Final result by Horacio Linder MD (11/19/19 19:41:19)                 Impression:      No acute cardiopulmonary process identified.      Electronically signed by: Horacio Linder MD  Date:    11/19/2019  Time:    19:41             Narrative:    EXAMINATION:  XR CHEST PA AND LATERAL    CLINICAL HISTORY:  Chest Pain;    TECHNIQUE:  PA and lateral views of the chest were performed.    COMPARISON:  01/30/2019.    FINDINGS:  Cardiac silhouette is normal in size.  Lungs are symmetrically expanded.  Mild increased interstitial attenuation is seen within the lower lung zones.  No evidence of focal consolidative process, pneumothorax, or significant effusion.  No acute osseous abnormality identified.                                 Medical Decision Making:   Independently Interpreted Test(s):   I have ordered and independently interpreted EKG Reading(s) - see prior notes  Clinical Tests:   Lab Tests: Ordered and Reviewed  Radiological Study: Ordered and Reviewed  Medical Tests: Ordered and Reviewed        Labs Reviewed  Admission on 11/19/2019   Component Date Value Ref Range Status    Albumin, POC 11/19/2019 3.9  3.3 - 5.5 g/dL Final    Alkaline Phosphatase, POC 11/19/2019 85  42 - 141 U/L Final    ALT (SGPT), POC 11/19/2019 14  10 - 47 U/L Final    AST (SGOT), POC 11/19/2019 24  11 - 38 U/L Final     POC BUN 11/19/2019 5* 7 - 22 mg/dL Final    Calcium, POC 11/19/2019 9.5  8 - 10.3 mg/dL Final    POC Chloride 11/19/2019 104  98 - 108 mmol/L Final    POC Creatinine 11/19/2019 0.7  0.6 - 1.2 mg/dL Final    POC Glucose 11/19/2019 101  73 - 118 mg/dL Final    POC Potassium 11/19/2019 2.8* 3.6 - 5.1 mmol/L Final    POC Sodium 11/19/2019 143  128 - 145 mmol/L Final    Bilirubin 11/19/2019 0.5  0.2 - 1.6 mg/dL Final    POC TCO2 11/19/2019 27  18 - 33 mmol/L Final    Protein 11/19/2019 7.5  6.4 - 8.1 g/dL Final    POC Cardiac Troponin I 11/19/2019 0.00  <0.09 ng/mL Final    Sample 11/19/2019 UNK   Final    POC B-Type Natriuretic Peptide 11/19/2019 9.5  0 - 100 pg/mL Final    POC D-DI 11/19/2019 222  0 - 450 ng/mL Final        Imaging Reviewed    Imaging Results          X-Ray Chest PA And Lateral (Final result)  Result time 11/19/19 19:41:19    Final result by Horacio Linder MD (11/19/19 19:41:19)                 Impression:      No acute cardiopulmonary process identified.      Electronically signed by: Horacio Linder MD  Date:    11/19/2019  Time:    19:41             Narrative:    EXAMINATION:  XR CHEST PA AND LATERAL    CLINICAL HISTORY:  Chest Pain;    TECHNIQUE:  PA and lateral views of the chest were performed.    COMPARISON:  01/30/2019.    FINDINGS:  Cardiac silhouette is normal in size.  Lungs are symmetrically expanded.  Mild increased interstitial attenuation is seen within the lower lung zones.  No evidence of focal consolidative process, pneumothorax, or significant effusion.  No acute osseous abnormality identified.                                Medications given in ED    Medications   magnesium sulfate in dextrose IVPB (premix) 1 g (1 g Intravenous New Bag 11/19/19 2138)   aspirin tablet 325 mg (325 mg Oral Given 11/19/19 2005)   0.9%  NaCl infusion (1,000 mLs Intravenous New Bag 11/19/19 2033)   potassium chloride SA CR tablet 40 mEq (40 mEq Oral Given 11/19/19 2030)   potassium  chloride 10 mEq in 100 mL IVPB (0 mEq Intravenous Stopped 11/19/19 2141)   diazePAM tablet 5 mg (5 mg Oral Given 11/19/19 2029)       This document was produced by a scribe under my direction and in my presence. I agree with the content of the note and have made any necessary edits.     Yadira Fernandes MD         Note was created using voice recognition software. Note may have occasional typographical errors that may not have been identified and edited despite good heraclio initial review prior to signing.            Scribe Attestation:   Scribe #1: I performed the above scribed service and the documentation accurately describes the services I performed. I attest to the accuracy of the note.           Vitals:    11/19/19 1918 11/19/19 1927 11/19/19 2001 11/19/19 2030   BP: (!) 143/83  120/77    Pulse: 78 74 70 78   Resp: 18 19 19   Temp: 98.5 °F (36.9 °C)      SpO2: 100% 100%  99%                        Clinical Impression:     1. Chest pain, unspecified type    2. Chest pain    3. Hypokalemia            Disposition:   Disposition: Placed in Observation  Condition: Stable  Reason for referral: hypokalemia, chest pain  [facility] by Dr. Chinchilla for Dr. GENI Fernandes MD  11/22/19 4908

## 2019-11-20 NOTE — PROGRESS NOTES
"EDUCATION:  provided with educational information on chest pain.  Information reviewed and placed in :My Healthcare Packet" to be brought home  to use as resource after discharge.  Information included:  signs and symptoms to look for and call the doctor if experiencing, and symptoms that may indicate a medical emergency: CALL 911.      All questions answered.  Teach back method used.    Patient stated, " will go to follow up appointment as scheduled and take medications as prescribed ".        "

## 2019-11-20 NOTE — ED NOTES
Ems arrived in ed; report given to ems; paperwork given to ems included: transfer/emtala form, ed record, and facesheet

## 2019-11-20 NOTE — HOSPITAL COURSE
Placed in observation for evaluation of atypical chest pain and abnormal EKG - cardiology consulted 2D echo and NST obtained - NST free of myocardial ischemia. 2D echo pending. Vitals stable - patient in good spirits. Added amlodipine 10 mg due to the fact that the patient will not reveal her home mediations and it is unclear what she is on to control her blood pressure. Potassium 2.8 at the time of presentation - oral supplementation improved to 3.0, home on potassium 40 meq BID X 2 days. Denies further chest pain - no other complaints - otherwise hospitalization was uneventful. She can stop the other meds. Cleared for discharge.

## 2019-11-20 NOTE — NURSING
In preparation for discharge, d/c'ed patient's tele monitor, NSR prior to removal, d/c'ed patient's saline lock, applied pressure to site, secured site with tape and gauze. Discharge instructions given to patient and family at bedside. Patient verbalized understanding of instructions. Patient states willingness to comply.

## 2019-11-20 NOTE — DISCHARGE SUMMARY
"[facility] Hospital Medicine  Discharge Summary      Patient Name: Jayde Kinney  MRN: [id number]  Admission Date: 11/19/2019  Hospital Length of Stay: 0 days  Discharge Date and Time:  11/20/2019 2:40 PM  Attending Physician: Beatris Blankenship MD   Discharging Provider: ANA Raymond  Primary Care Provider: To Obtain Unable      HPI:   Jayde Kinney is a 53 y.o. AAF with PMHx including HTN, DMII, and cigarette smoker. She presented for evaluation of acute onset of non-radiating chest stabbing-like pain that started when she was crying about her sisters grim condition on life support. She reports that she did not have NTG in ED. She denies fever/chills, NVD, long trips, HA, hormone use, recent trauma, or focal deficits.      Requested that patient call family member to get her mediation names but she stated "NO" says she doen't know the names of her medications.    * No surgery found *      Hospital Course:   Placed in observation for evaluation of atypical chest pain and abnormal EKG - cardiology consulted 2D echo and NST obtained - NST free of myocardial ischemia. 2D echo pending. Vitals stable - patient in good spirits. Added amlodipine 10 mg due to the fact that the patient will not reveal her home mediations and it is unclear what she is on to control her blood pressure. Potassium 2.8 at the time of presentation - oral supplementation improved to 3.0, home on potassium 40 meq BID X 2 days. Denies further chest pain - no other complaints - otherwise hospitalization was uneventful. She can stop the other meds. Cleared for discharge.     Consults:   Consults (From admission, onward)        Status Ordering Provider     Inpatient consult to Cardiology  Once     Provider:  Raj Harris MD    Acknowledged ADRIÁN MERCER          No new Assessment & Plan notes have been filed under this hospital service since the last note was generated.  Service: Hospital Medicine    Final Active " Diagnoses:    Diagnosis Date Noted POA    PRINCIPAL PROBLEM:  Chest pain [R07.9] 11/19/2019 Yes    Prolonged Q-T interval on ECG [R94.31] 11/20/2019 Yes    Essential hypertension [I10] 11/20/2019 Yes      Problems Resolved During this Admission:       Discharged Condition: stable    Disposition: Home or Self Care    Follow Up:  Follow-up Information     Raj Harris MD On 12/19/2019.    Specialty:  Cardiology  Why:  Appointment scheduled for December 19th at 10:45am  Contact information:  [address] [facility] [facility]  [address]  [place] [address]  [phone]             Krystina Sainz MD On 11/27/2019.    Specialty:  Family Medicine  Why:  Appointment scheduled for November 27th at 9:45am  Contact information:  [address] [place] [id number] [phone]                 Patient Instructions:      Diet Cardiac     Activity as tolerated       Significant Diagnostic Studies: Labs:   CMP   Recent Labs   Lab 11/20/19  0211      K 2.8*      CO2 21*   *   BUN 5*   CREATININE 0.7   CALCIUM 8.7   ANIONGAP 14   ESTGFRAFRICA >60   EGFRNONAA >60       , Troponin   Recent Labs   Lab 11/20/19  0759   TROPONINI 0.006    and A1C: No results for input(s): HGBA1C in the last 4320 hours.     Medications:  Reconciled Home Medications:      Medication List      START taking these medications    amLODIPine 10 MG tablet  Commonly known as:  NORVASC  Take 1 tablet (10 mg total) by mouth once daily.     potassium chloride SA 20 MEQ tablet  Commonly known as:  K-DUR,KLOR-CON  Take 2 tablets (40 mEq total) by mouth 2 (two) times daily. for 2 days        CONTINUE taking these medications    albuterol 90 mcg/actuation inhaler  Commonly known as:  PROVENTIL/VENTOLIN HFA  Inhale 2 puffs into the lungs every 6 (six) hours as needed for Wheezing. Rescue            Indwelling Lines/Drains at time of discharge:   Lines/Drains/Airways     None                 Time spent on the discharge of patient: 35 minutes  Patient was seen  and examined on the date of discharge and determined to be suitable for discharge.         LEANDRO De Los Santos, FNP-C  Hospitalist - Department of Hospital Medicine  [address] [place] [address]  Office [phone]; Pager [phone]

## 2019-11-20 NOTE — PLAN OF CARE
Problem: Fall Injury Risk  Goal: Absence of Fall and Fall-Related Injury  11/20/2019 1635 by Oma Villareal RN  Outcome: Ongoing, Progressing  11/20/2019 1133 by Oma Villareal RN  Outcome: Ongoing, Progressing  Intervention: Identify and Manage Contributors to Fall Injury Risk  11/20/2019 1635 by Oma Villareal RN  Flowsheets (Taken 11/20/2019 1635)  Self-Care Promotion: independence encouraged  Medication Review/Management: medications reviewed  11/20/2019 1133 by Oma Villareal RN  Flowsheets (Taken 11/20/2019 1133)  Self-Care Promotion: independence encouraged;BADL personal objects within reach  Medication Review/Management: medications reviewed  Intervention: Promote Injury-Free Environment  11/20/2019 1635 by Oma Villareal RN  Flowsheets (Taken 11/20/2019 1635)  Safety Promotion/Fall Prevention: assistive device/personal item within reach; bed alarm set; commode/urinal/bedpan at bedside; Fall Risk reviewed with patient/family; lighting adjusted; medications reviewed; nonskid shoes/socks when out of bed; room near unit station; side rails raised x 2; instructed to call staff for mobility  Environmental Safety Modification: assistive device/personal items within reach; clutter free environment maintained; lighting adjusted; room organization consistent; room near unit station  11/20/2019 1133 by Oma Villareal RN  Flowsheets (Taken 11/20/2019 1133)  Safety Promotion/Fall Prevention: assistive device/personal item within reach;bed alarm set;commode/urinal/bedpan at bedside;Fall Risk reviewed with patient/family;lighting adjusted;medications reviewed;nonskid shoes/socks when out of bed;room near unit station;side rails raised x 2;instructed to call staff for mobility  Environmental Safety Modification: assistive device/personal items within reach;clutter free environment maintained;lighting adjusted;room organization consistent;room near unit station

## 2019-11-20 NOTE — NURSING
"Patient inquiring about home medications. Per home medication list no medications listed. Patient states she takes a medication for blood pressure but does not recall the name or amount. Asked patient if she could call a friend or family member to bring or call in the names from her prescription bottles at home. Patient states no one available and states she has been out of her medication for "a while now".   "

## 2019-11-20 NOTE — ASSESSMENT & PLAN NOTE
States that she does not know her medications and will not call family  Amlodipine for BP  Monitor closely

## 2020-05-29 ENCOUNTER — HOSPITAL ENCOUNTER (OUTPATIENT)
Facility: HOSPITAL | Age: 55
Discharge: HOME | End: 2020-05-31
Attending: EMERGENCY MEDICINE | Admitting: ORTHOPAEDIC SURGERY
Payer: MEDICARE

## 2020-05-29 DIAGNOSIS — F10.929 ALCOHOLIC INTOXICATION WITH COMPLICATION: ICD-10-CM

## 2020-05-29 DIAGNOSIS — R55 SYNCOPE: ICD-10-CM

## 2020-05-29 DIAGNOSIS — S12.001A CLOSED NONDISPLACED FRACTURE OF FIRST CERVICAL VERTEBRA, UNSPECIFIED FRACTURE MORPHOLOGY, INITIAL ENCOUNTER: Primary | ICD-10-CM

## 2020-05-29 PROCEDURE — 93005 ELECTROCARDIOGRAM TRACING: CPT

## 2020-05-29 PROCEDURE — U0002 COVID-19 LAB TEST NON-CDC: HCPCS

## 2020-05-29 PROCEDURE — 93010 EKG 12-LEAD: ICD-10-PCS | Mod: ,,, | Performed by: INTERNAL MEDICINE

## 2020-05-29 PROCEDURE — 93010 ELECTROCARDIOGRAM REPORT: CPT | Mod: ,,, | Performed by: INTERNAL MEDICINE

## 2020-05-29 RX ORDER — ONDANSETRON 2 MG/ML
4 INJECTION INTRAMUSCULAR; INTRAVENOUS
Status: DISPENSED | OUTPATIENT
Start: 2020-05-29 | End: 2020-05-30

## 2020-05-30 PROBLEM — S12.000A C1 CERVICAL FRACTURE: Status: ACTIVE | Noted: 2020-05-30

## 2020-05-30 PROBLEM — S12.001A CLOSED NONDISPLACED FRACTURE OF FIRST CERVICAL VERTEBRA: Status: ACTIVE | Noted: 2020-05-30

## 2020-05-30 LAB
ALBUMIN SERPL BCP-MCNC: 4.1 G/DL (ref 3.5–5.2)
ALP SERPL-CCNC: 83 U/L (ref 55–135)
ALT SERPL W/O P-5'-P-CCNC: 13 U/L (ref 10–44)
AMPHET+METHAMPHET UR QL: NEGATIVE
ANION GAP SERPL CALC-SCNC: 16 MMOL/L (ref 8–16)
AST SERPL-CCNC: 24 U/L (ref 10–40)
BACTERIA #/AREA URNS HPF: ABNORMAL /HPF
BARBITURATES UR QL SCN>200 NG/ML: NEGATIVE
BASOPHILS # BLD AUTO: 0.06 K/UL (ref 0–0.2)
BASOPHILS NFR BLD: 0.7 % (ref 0–1.9)
BENZODIAZ UR QL SCN>200 NG/ML: NEGATIVE
BILIRUB SERPL-MCNC: 0.2 MG/DL (ref 0.1–1)
BILIRUB UR QL STRIP: NEGATIVE
BNP SERPL-MCNC: <10 PG/ML (ref 0–99)
BUN SERPL-MCNC: 6 MG/DL (ref 6–20)
BZE UR QL SCN: NEGATIVE
CALCIUM SERPL-MCNC: 9.8 MG/DL (ref 8.7–10.5)
CANNABINOIDS UR QL SCN: NEGATIVE
CHLORIDE SERPL-SCNC: 106 MMOL/L (ref 95–110)
CLARITY UR: CLEAR
CO2 SERPL-SCNC: 22 MMOL/L (ref 23–29)
COLOR UR: ABNORMAL
CREAT SERPL-MCNC: 0.8 MG/DL (ref 0.5–1.4)
CREAT UR-MCNC: 17.7 MG/DL (ref 15–325)
DIFFERENTIAL METHOD: ABNORMAL
EOSINOPHIL # BLD AUTO: 0.2 K/UL (ref 0–0.5)
EOSINOPHIL NFR BLD: 2.1 % (ref 0–8)
ERYTHROCYTE [DISTWIDTH] IN BLOOD BY AUTOMATED COUNT: 14.1 % (ref 11.5–14.5)
EST. GFR  (AFRICAN AMERICAN): >60 ML/MIN/1.73 M^2
EST. GFR  (NON AFRICAN AMERICAN): >60 ML/MIN/1.73 M^2
ETHANOL SERPL-MCNC: 319 MG/DL
GLUCOSE SERPL-MCNC: 99 MG/DL (ref 70–110)
GLUCOSE UR QL STRIP: NEGATIVE
HCT VFR BLD AUTO: 43.1 % (ref 37–48.5)
HGB BLD-MCNC: 13.7 G/DL (ref 12–16)
HGB UR QL STRIP: ABNORMAL
IMM GRANULOCYTES # BLD AUTO: 0.02 K/UL (ref 0–0.04)
IMM GRANULOCYTES NFR BLD AUTO: 0.2 % (ref 0–0.5)
KETONES UR QL STRIP: NEGATIVE
LEUKOCYTE ESTERASE UR QL STRIP: NEGATIVE
LIPASE SERPL-CCNC: 36 U/L (ref 4–60)
LYMPHOCYTES # BLD AUTO: 4.4 K/UL (ref 1–4.8)
LYMPHOCYTES NFR BLD: 50.3 % (ref 18–48)
MCH RBC QN AUTO: 25.7 PG (ref 27–31)
MCHC RBC AUTO-ENTMCNC: 31.8 G/DL (ref 32–36)
MCV RBC AUTO: 81 FL (ref 82–98)
METHADONE UR QL SCN>300 NG/ML: NEGATIVE
MICROSCOPIC COMMENT: ABNORMAL
MONOCYTES # BLD AUTO: 0.5 K/UL (ref 0.3–1)
MONOCYTES NFR BLD: 6.2 % (ref 4–15)
NEUTROPHILS # BLD AUTO: 3.5 K/UL (ref 1.8–7.7)
NEUTROPHILS NFR BLD: 40.5 % (ref 38–73)
NITRITE UR QL STRIP: POSITIVE
NRBC BLD-RTO: 0 /100 WBC
OPIATES UR QL SCN: NEGATIVE
PCP UR QL SCN>25 NG/ML: NEGATIVE
PH UR STRIP: 5 [PH] (ref 5–8)
PLATELET # BLD AUTO: 404 K/UL (ref 150–350)
PMV BLD AUTO: 9.3 FL (ref 9.2–12.9)
POTASSIUM SERPL-SCNC: 3.6 MMOL/L (ref 3.5–5.1)
PROT SERPL-MCNC: 8 G/DL (ref 6–8.4)
PROT UR QL STRIP: NEGATIVE
RBC # BLD AUTO: 5.33 M/UL (ref 4–5.4)
RBC #/AREA URNS HPF: 0 /HPF (ref 0–4)
SARS-COV-2 RDRP RESP QL NAA+PROBE: NEGATIVE
SODIUM SERPL-SCNC: 144 MMOL/L (ref 136–145)
SP GR UR STRIP: 1 (ref 1–1.03)
SQUAMOUS #/AREA URNS HPF: ABNORMAL /HPF
TOXICOLOGY INFORMATION: NORMAL
TROPONIN I SERPL DL<=0.01 NG/ML-MCNC: <0.006 NG/ML (ref 0–0.03)
URN SPEC COLLECT METH UR: ABNORMAL
UROBILINOGEN UR STRIP-ACNC: NEGATIVE EU/DL
WBC # BLD AUTO: 8.71 K/UL (ref 3.9–12.7)
WBC #/AREA URNS HPF: 1 /HPF (ref 0–5)

## 2020-05-30 PROCEDURE — G0378 HOSPITAL OBSERVATION PER HR: HCPCS

## 2020-05-30 PROCEDURE — 83880 ASSAY OF NATRIURETIC PEPTIDE: CPT

## 2020-05-30 PROCEDURE — 99220 PR INITIAL OBSERVATION CARE,LEVL III: ICD-10-PCS | Mod: 57,,, | Performed by: ORTHOPAEDIC SURGERY

## 2020-05-30 PROCEDURE — 84484 ASSAY OF TROPONIN QUANT: CPT

## 2020-05-30 PROCEDURE — 22310 CLOSED TX VERT FX W/O MANJ: CPT | Mod: ,,, | Performed by: ORTHOPAEDIC SURGERY

## 2020-05-30 PROCEDURE — 80307 DRUG TEST PRSMV CHEM ANLYZR: CPT

## 2020-05-30 PROCEDURE — 96360 HYDRATION IV INFUSION INIT: CPT | Mod: 59

## 2020-05-30 PROCEDURE — 22310 PR CLOSED TREAT VERT BODY FRACT: ICD-10-PCS | Mod: ,,, | Performed by: ORTHOPAEDIC SURGERY

## 2020-05-30 PROCEDURE — 25500020 PHARM REV CODE 255: Performed by: EMERGENCY MEDICINE

## 2020-05-30 PROCEDURE — 83690 ASSAY OF LIPASE: CPT

## 2020-05-30 PROCEDURE — 25000003 PHARM REV CODE 250: Performed by: STUDENT IN AN ORGANIZED HEALTH CARE EDUCATION/TRAINING PROGRAM

## 2020-05-30 PROCEDURE — 25000003 PHARM REV CODE 250: Performed by: EMERGENCY MEDICINE

## 2020-05-30 PROCEDURE — 85025 COMPLETE CBC W/AUTO DIFF WBC: CPT

## 2020-05-30 PROCEDURE — 80053 COMPREHEN METABOLIC PANEL: CPT

## 2020-05-30 PROCEDURE — 96361 HYDRATE IV INFUSION ADD-ON: CPT

## 2020-05-30 PROCEDURE — 80320 DRUG SCREEN QUANTALCOHOLS: CPT

## 2020-05-30 PROCEDURE — 81000 URINALYSIS NONAUTO W/SCOPE: CPT | Mod: 59

## 2020-05-30 PROCEDURE — 99291 CRITICAL CARE FIRST HOUR: CPT | Mod: 25

## 2020-05-30 PROCEDURE — 94761 N-INVAS EAR/PLS OXIMETRY MLT: CPT

## 2020-05-30 PROCEDURE — 99220 PR INITIAL OBSERVATION CARE,LEVL III: CPT | Mod: 57,,, | Performed by: ORTHOPAEDIC SURGERY

## 2020-05-30 RX ORDER — OXYCODONE HYDROCHLORIDE 5 MG/1
5 TABLET ORAL EVERY 6 HOURS PRN
Status: DISCONTINUED | OUTPATIENT
Start: 2020-05-30 | End: 2020-05-31 | Stop reason: HOSPADM

## 2020-05-30 RX ORDER — ACETAMINOPHEN 325 MG/1
650 TABLET ORAL EVERY 6 HOURS PRN
Status: DISCONTINUED | OUTPATIENT
Start: 2020-05-30 | End: 2020-05-31 | Stop reason: HOSPADM

## 2020-05-30 RX ADMIN — SODIUM CHLORIDE 1000 ML: 0.9 INJECTION, SOLUTION INTRAVENOUS at 12:05

## 2020-05-30 RX ADMIN — ACETAMINOPHEN 650 MG: 325 TABLET ORAL at 08:05

## 2020-05-30 RX ADMIN — IOHEXOL 70 ML: 350 INJECTION, SOLUTION INTRAVENOUS at 01:05

## 2020-05-30 NOTE — HPI
"53 yo female presents as consult from [facility] for C1 fracture.  Patient states that she fell 3 days ago and has had neck pain which is not improved since that time.  However, per notes from outside hospital patient previously reported that she blacked out earlier today after drinking a lot of alcohol in response to her friend's death.  She was walking into her house with her  and "blacked out."  called EMS. EMS placed patient in c collar and on spineboard for transport.  Patient reports headache, neck pain and back pain.  Patient reports some decreased sensation in the ring finger.  She denies weakness in bilateral upper lower extremities.  She denies any bowel or bladder changes.  She denies having any difficulty with buttons or keys.  She denies any perianal paresthesias.  She reports ambulating with a walker at baseline since having a tibia shaft fracture in March of 2019.  "

## 2020-05-30 NOTE — ED TRIAGE NOTES
Pt here via EMS reported to be drinking alcohol all day and had a fall from a standing position at home tonight; pt c/o head, neck, & back pain since fall; no obvious injuries or open wounds noted; pt appears intoxicated; pt arrives to ED in c-collar and on spine board

## 2020-05-30 NOTE — ED TRIAGE NOTES
Pt fell a couple days ago and states worsening neck pain. Pt is a transfer from [facility] and has a C1 fracture. Pt denies nausea, vomiting, diarrhea, chest pain or SOB.

## 2020-05-30 NOTE — CONSULTS
"[facility]  Orthopedics  Consult Note    Patient Name: Jayde Kinney  MRN: [id number]  Admission Date: 2020  Hospital Length of Stay: 0 days  Attending Provider: Shawna Rodriguez MD  Primary Care Provider: To Obtain Unable    Patient information was obtained from patient and ER records.     Inpatient consult to Orthopedic Surgery  Consult performed by: Jean Rodriguez MD  Consult ordered by: Jose Schrader PA-C        Subjective:     Principal Problem:<principal problem not specified>    Chief Complaint:   Chief Complaint   Patient presents with    Alcohol Intoxication     pt reported to be drinking alcohol all day and had a fall from a standing position at home tonight; pt c/o head, neck, & back pain since fall; no obvious injuries or open wounds noted; pt appears intoxicated; pt arrives to ED in c-collar and on spine board    Fall        HPI: 55 yo female presents as consult from [facility] for C1 fracture.  Patient states that she fell 3 days ago and has had neck pain which is not improved since that time.  However, per notes from outside hospital patient previously reported that she blacked out earlier today after drinking a lot of alcohol in response to her friend's death.  She was walking into her house with her  and "blacked out."  called EMS. EMS placed patient in c collar and on spineboard for transport.  Patient reports headache, neck pain and back pain.  Patient reports some decreased sensation in the ring finger.  She denies weakness in bilateral upper lower extremities.  She denies any bowel or bladder changes.  She denies having any difficulty with buttons or keys.  She denies any perianal paresthesias.  She reports ambulating with a walker at baseline since having a tibia shaft fracture in 2019.    Past Medical History:   Diagnosis Date    Arthritis     Hypertension        Past Surgical History:   Procedure Laterality Date     " "SECTION      FRACTURE SURGERY      Left leg        Review of patient's allergies indicates:  No Known Allergies    Current Facility-Administered Medications   Medication    ondansetron injection 4 mg     Current Outpatient Medications   Medication Sig    amLODIPine (NORVASC) 10 MG tablet Take 1 tablet (10 mg total) by mouth once daily.    albuterol (PROVENTIL/VENTOLIN HFA) 90 mcg/actuation inhaler Inhale 2 puffs into the lungs every 6 (six) hours as needed for Wheezing. Rescue     Family History     None        Tobacco Use    Smoking status: Current Every Day Smoker     Packs/day: 0.50     Types: Cigarettes    Smokeless tobacco: Never Used   Substance and Sexual Activity    Alcohol use: Yes     Comment: occasionally    Drug use: No    Sexual activity: Not on file     Review of Systems   Constitution: Negative for malaise/fatigue.   Eyes: Negative for vision loss in left eye.   Cardiovascular: Negative for near-syncope.   Respiratory: Negative for sputum production.    Endocrine: Negative for heat intolerance.   Skin: Negative for suspicious lesions.   Musculoskeletal:        See HPI   Gastrointestinal: Negative for abdominal pain.   Genitourinary: Negative for dysuria.   Neurological: Negative for headaches.        See HPI      Objective:     Vital Signs (Most Recent):  Temp: 97.6 °F (36.4 °C) (05/29/20 2311)  Pulse: 78 (05/30/20 0126)  Resp: 18 (05/30/20 0126)  BP: 124/73 (05/30/20 0126)  SpO2: 96 % (05/30/20 0126) Vital Signs (24h Range):  Temp:  [97.6 °F (36.4 °C)] 97.6 °F (36.4 °C)  Pulse:  [74-78] 78  Resp:  [18] 18  SpO2:  [96 %-99 %] 96 %  BP: (124-154)/(73-84) 124/73     Weight: 81.6 kg (180 lb)  Height: 5' 7" (170.2 cm)  Body mass index is 28.19 kg/m².    No intake or output data in the 24 hours ending 05/30/20 0418    Ortho/SPM Exam    PHYSICAL EXAM:  Awake/Alert/Oriented x3, No acute distress, Afebrile, Vital signs stable  cephalohematoma 2 left lateral head  Palpable distal pulses  Good " inspiratory effort with unlaboured breathing      C collar in place  No skin breakdown or lesions noted to posterior spine  Patient reports diffuse tenderness to palpation along CTL spine  No palpable step-offs      Motor             RIGHT  LEFT  Iliopsoas (L2,3)       5/5    5/5  Quadriceps (L3,4)      5/5    5/5   Tibialis Anterior (L4.5)         5/5    5/5   Extensor Halucis Longus (L5)    5/5    5/5     Gastrocnemius/Soleus (S1)     5/5    5/5  Flexor Hallucis Longus(S2)     5/5    5/5    Sensation (a=absent, i=impaired, n=normal)       RIGHT   LEFT  L2 dermatome       n     n  L3 dermatome       n     n  L4 dermatome       n     n  L5 dermatome       n     n  S1 dermatome       n     n  S2 dermatome       n     n    Reflexes        RIGHT  LEFT  Patellar       2+     2+  Achilles       2+     2+  Babinski      neg    neg  Clonus          neg    neg        Motor            RIGHT  LEFT  Deltoid(C5)        5/5    5/5  Biceps(C5)         5/5    5/5  Extensor Carpi Radialis Longus(C6)    5/5    5/5   Triceps(C7)       5/5    5/5     Flexor Carpi Radialis(C7)     5/5    5/5  Flexor Digitorum Superficialis(C8)    5/5    5/5  Interossei(T1)       5/5    5/5     Sensation (a=absent, i=impaired, n=normal)       RIGHT  LEFT  C5 dermatome       n     n  C6 dermatome       n     n  C7 dermatome       n     n  C8 dermatome       n     n  T1 dermatome       n     n    Reflexes       RIGHT  LEFT  Triceps        2+     2+  Biceps         2+     2+  Brachioradialis       2+           2+  Hoffmans's       neg    neg      Significant Labs: All pertinent labs within the past 24 hours have been reviewed.    Significant Imaging: I have reviewed and interpreted all pertinent imaging results/findings.     CT cervical spine shows right anterior and posterior arch fractures of C1 with minimal displacement    MRI pending    Assessment/Plan:     C1 cervical fracture  Patient is a 54-year-old female with a type 3 C1 fracture involving right  anterior and posterior arches.  No neurological deficits on exam.  MRI ordered to evaluate ligamentous integrity to assess cervical spine stability.  Will follow MRI for further planning.  Patient is to remain in C-collar at this time.  NPO at this time.            Jean Rodriguez MD  Orthopedics  [facility]

## 2020-05-30 NOTE — PROGRESS NOTES
Received Telephone order from Dr. Rodriguez to put in Oxycodone 5mg Q6 for severe pain and Tyelonol for moderate pain. If pt needs Zofran or Benadryl Verbal order was given to put in under Wilbur Rodriguez MD if needed.

## 2020-05-30 NOTE — ED NOTES
Pt placed on portable telemetry monitoring box.  Ability to visualize pt's rhythm confirmed with of telemetry.  Artifact due to pt snoring with a HR of 78 noted.

## 2020-05-30 NOTE — ED PROVIDER NOTES
"Encounter Date: 2020       History     Chief Complaint   Patient presents with    Alcohol Intoxication     pt reported to be drinking alcohol all day and had a fall from a standing position at home tonight; pt c/o head, neck, & back pain since fall; no obvious injuries or open wounds noted; pt appears intoxicated; pt arrives to ED in c-collar and on spine board    Fall     53 yo female presents via [facility] s/p syncope. Patient drank a lot today; her good friend just  of COVID-19. She was walking into her house with her  and "blacked out."  called EMS. EMS placed patient in ccollar and on spineboard for transport. EMS also gave Zofran PTA. EMS noted VSS. Patient reports headache, neck pain, and diffuse back pain, as well as mild chest pain and shortness of breath. She is also slightly nauseated.     Patient's daughters:  Ciara [phone]  Chichi [phone] / [phone]  Raine Dove [phone]  Shawna [phone]    PMH: HTN    PSH: csection, knee surgery    TTE 19  Left Ventricle Normal ejection fraction at 60%. Concentric observed. Normal left ventricular diastolic function.  Right Ventricle Normal cavity size, wall thickness and systolic function. Wall motion normal.  Left Atrium The left atrium is normal. Left atrial volume index is 30.3 mL/m2.  Right Atrium There is normal right atrial size.  Aortic Valve The aortic valve appears structurally normal. There is normal leaflet mobility.  Mitral Valve The mitral valve appears structurally normal. There is normal leaflet mobility.  Tricuspid Valve The tricuspid valve appears structurally normal.  Pulmonic Valve Normal valve structure. No stenosis. No regurgitation.  IVC/SVC Inferior vena cava is not well visualized.        Review of patient's allergies indicates:  No Known Allergies  Past Medical History:   Diagnosis Date    Arthritis     Hypertension      Past Surgical History:   Procedure Laterality Date     " SECTION      FRACTURE SURGERY      Left leg      History reviewed. No pertinent family history.  Social History     Tobacco Use    Smoking status: Current Every Day Smoker     Packs/day: 0.50     Types: Cigarettes    Smokeless tobacco: Never Used   Substance Use Topics    Alcohol use: Yes     Comment: occasionally    Drug use: No     Review of Systems   Constitutional: Negative for fever.   HENT: Negative for sore throat.    Eyes: Negative for photophobia.   Respiratory: Positive for shortness of breath.    Cardiovascular: Positive for chest pain.   Gastrointestinal: Positive for nausea.   Genitourinary: Negative for dysuria.   Musculoskeletal: Positive for back pain and neck pain.   Skin: Negative for rash.   Neurological: Positive for syncope.       Physical Exam     Initial Vitals [05/29/20 2311]   BP Pulse Resp Temp SpO2   (!) 154/84 74 18 97.6 °F (36.4 °C) 99 %      MAP       --         Physical Exam    Nursing note and vitals reviewed.  Constitutional: She appears well-developed and well-nourished. She is not diaphoretic.   Lying on EMS spineboard, answers questions appropriately. Slurred speech. Obese.   HENT:   Head: Normocephalic.   Left-sided occipital hematoma, TTP.   Eyes: Conjunctivae and EOM are normal.   Lateral gaze nystagmus. Bloodshot sclerae.   Neck:   Ccollar in place by EMS, maintained.   Cardiovascular: Normal rate, regular rhythm, normal heart sounds and intact distal pulses.   No murmur heard.  Pulmonary/Chest: Breath sounds normal. No respiratory distress. She has no wheezes. She has no rhonchi. She has no rales.   Abdominal: Soft. There is no tenderness.   Musculoskeletal: Normal range of motion. She exhibits tenderness (diffuse midline back TTP). She exhibits no edema.   Neurological: She is alert and oriented to person, place, and time. She has normal strength.   Moving all extremities.   Skin: Skin is warm and dry. No erythema. No pallor.   Psychiatric: She has a normal mood and  affect.         ED Course   Critical Care  Date/Time: 5/30/2020 1:57 AM  Performed by: Rosina Vizcaino MD  Authorized by: Rosina Vizcaino MD   Direct patient critical care time: 10 minutes  Additional history critical care time: 5 minutes  Ordering / reviewing critical care time: 10 minutes  Documentation critical care time: 5 minutes  Consult with family critical care time: 8 minutes  Total critical care time (exclusive of procedural time) : 38 minutes        Labs Reviewed   CBC W/ AUTO DIFFERENTIAL - Abnormal; Notable for the following components:       Result Value    Mean Corpuscular Volume 81 (*)     Mean Corpuscular Hemoglobin 25.7 (*)     Mean Corpuscular Hemoglobin Conc 31.8 (*)     Platelets 404 (*)     Lymph% 50.3 (*)     All other components within normal limits   COMPREHENSIVE METABOLIC PANEL - Abnormal; Notable for the following components:    CO2 22 (*)     All other components within normal limits   ALCOHOL,MEDICAL (ETHANOL) - Abnormal; Notable for the following components:    Alcohol, Medical, Serum 319 (*)     All other components within normal limits    Narrative:     Alcohol critical result(s) called and verbal readback obtained from   Kinsey Chatterjee  by [facility] 05/30/2020 00:57   SARS-COV-2 RNA AMPLIFICATION, QUAL   B-TYPE NATRIURETIC PEPTIDE   LIPASE   TROPONIN I   DRUG SCREEN PANEL, URINE EMERGENCY    Narrative:     Preferred Collection Type->Urine, Clean Catch   URINALYSIS, REFLEX TO URINE CULTURE   URINALYSIS MICROSCOPIC     EKG Readings: (Independently Interpreted)   23:32: NSR, HR 73. Normal axis. TWI in III, aVF. No STEMI.        Imaging Results          CT Chest Abdomen Pelvis With Contrast (In process)  Result time 05/30/20 01:56:09               CT Head Without Contrast (Final result)  Result time 05/30/20 00:11:25    Final result by Mari Cordoba MD (05/30/20 00:11:25)                 Impression:      No acute intracranial abnormality detected. Left parietooccipital  cephalohematoma.    Acute traumatic fractures involving the right anterior arch and right posterior arch of C1.  Spondylitic changes.    Findings called to Dr. Vizcaino at 00:05 on 05/30/2020.      Electronically signed by: Mari Cordoba  Date:    05/30/2020  Time:    00:11             Narrative:    EXAMINATION:  CT OF THE HEAD WITHOUT AND CT CERVICAL SPINE    CLINICAL HISTORY:  Head trauma, headache;; C-spine trauma, NEXUS/CCR positive, +risk factor(s);    TECHNIQUE:  5 mm unenhanced axial images were obtained from the skull base to the vertex.  1.25 mm axial images were obtained through the cervical spine.    COMPARISON:  None.    FINDINGS:  CT head: A left parietooccipital cephalohematoma is noted.  The ventricles, basal cisterns, and cortical sulci are within normal limits for patient's stated age. There is no acute intracranial hemorrhage, territorial infarct or mass effect, or midline shift. In the visualized paranasal sinuses, there is mild bilateral maxillary sinus mucoperiosteal thickening.    CT cervical spine: There is <normal alignment of the cervical spine>.  Acute traumatic fractures of the right anterior arch of C1 and the right posterior arch of C1 present.  Multilevel degenerative changes spine is present.                               CT Cervical Spine Without Contrast (Final result)  Result time 05/30/20 00:11:25    Final result by Mari Cordoba MD (05/30/20 00:11:25)                 Impression:      No acute intracranial abnormality detected. Left parietooccipital cephalohematoma.    Acute traumatic fractures involving the right anterior arch and right posterior arch of C1.  Spondylitic changes.    Findings called to Dr. Vizcaino at 00:05 on 05/30/2020.      Electronically signed by: Mari Cordoba  Date:    05/30/2020  Time:    00:11             Narrative:    EXAMINATION:  CT OF THE HEAD WITHOUT AND CT CERVICAL SPINE    CLINICAL HISTORY:  Head trauma, headache;; C-spine trauma,  NEXUS/CCR positive, +risk factor(s);    TECHNIQUE:  5 mm unenhanced axial images were obtained from the skull base to the vertex.  1.25 mm axial images were obtained through the cervical spine.    COMPARISON:  None.    FINDINGS:  CT head: A left parietooccipital cephalohematoma is noted.  The ventricles, basal cisterns, and cortical sulci are within normal limits for patient's stated age. There is no acute intracranial hemorrhage, territorial infarct or mass effect, or midline shift. In the visualized paranasal sinuses, there is mild bilateral maxillary sinus mucoperiosteal thickening.    CT cervical spine: There is <normal alignment of the cervical spine>.  Acute traumatic fractures of the right anterior arch of C1 and the right posterior arch of C1 present.  Multilevel degenerative changes spine is present.                                 Medical Decision Making:   History:   I obtained history from: EMS provider.  Old Medical Records: I decided to obtain old medical records.  Old Records Summarized: records from previous admission(s) and records from another hospital.  Initial Assessment:   54 y.o. Female s/p syncope. EtOH today. Head, neck, back pain.  Differential Diagnosis:   Ddx includes ACS, dysrhythmia, anemia, toxidrome, occult trauma, other.  Independently Interpreted Test(s):   I have ordered and independently interpreted X-rays - see prior notes.  I have ordered and independently interpreted EKG Reading(s) - see prior notes  Clinical Tests:   Lab Tests: Ordered and Reviewed  Radiological Study: Ordered and Reviewed  Medical Tests: Ordered and Reviewed  ED Management:  EKG no STEMI.     CT head with left parietooccipital cephalohematoma but no skull fracture or ICH.    CT cspine shows C1 R anterior and posterior arch fractures.     Labs pending.    I discussed patient with transfer center and per transfer center, patient has been accepted by Neurosurgeon Dr. Rubens Chapin for ED to ED transfer.    I  ordered trauma scan due to back pain, unreliable exam due to EtOH/distracting injury. This has been done but not read.     I have updated the patient as to diagnosis and plan. I have also called patient's daughter Chichi and updated her as to patient's condition and need for transfer.  Other:   I have discussed this case with another health care provider.                        Patient Condition: Benefit outweighs risk  Reason for Transfer: Qualified clinical personnel or service unavailable, Hospital resources not available, MD request  Accepting Physician: Juancho Morel MD: Carrillo        Clinical Impression:       ICD-10-CM ICD-9-CM   1. Closed nondisplaced fracture of first cervical vertebra, unspecified fracture morphology, initial encounter S12.001A 805.01   2. Syncope R55 780.2   3. Alcoholic intoxication with complication F10.929 305.00             ED Disposition Condition    Transfer to Another Facility Stable                          Rosina Vizcaino MD  05/30/20 0157

## 2020-05-30 NOTE — ED PROVIDER NOTES
"Encounter Date: 2020       History     Chief Complaint   Patient presents with    Alcohol Intoxication     pt reported to be drinking alcohol all day and had a fall from a standing position at home tonight; pt c/o head, neck, & back pain since fall; no obvious injuries or open wounds noted; pt appears intoxicated; pt arrives to ED in c-collar and on spine board    Fall     HPI   Jayde Kinney is a 54 y.o. female with medical history of arthritis, HTN presenting to the ED via transfer from [facility] for Ortho spine evaluation of C1 fracture. Patient reports drinking ETOH tonight as she has been depressed regarding a friend that . History provided by patient, EMS and medical records. Patient experienced a fall inside her house today. Patient "blacked out" according to  and fell to the ground. Patient arrived to [facility] ED on spine board and c-collar. Patient reports headache and neck pain at the time of my exam. She reports having paresthesias in her RUE. She reports chronic numbness and weakness in her LLE from an old fracture.       Review of patient's allergies indicates:  No Known Allergies  Past Medical History:   Diagnosis Date    Arthritis     Hypertension      Past Surgical History:   Procedure Laterality Date     SECTION      FRACTURE SURGERY      Left leg      History reviewed. No pertinent family history.  Social History     Tobacco Use    Smoking status: Current Every Day Smoker     Packs/day: 0.50     Types: Cigarettes    Smokeless tobacco: Never Used   Substance Use Topics    Alcohol use: Yes     Comment: occasionally    Drug use: No     Review of Systems   Constitutional: Negative for fever.   HENT: Negative for congestion, sore throat and trouble swallowing.    Eyes: Negative for pain.   Respiratory: Negative for cough and shortness of breath.    Cardiovascular: Negative for chest pain.   Gastrointestinal: Negative for abdominal pain, constipation, diarrhea, nausea and " vomiting.   Genitourinary: Negative for dysuria.   Musculoskeletal: Positive for neck pain. Negative for back pain.   Skin: Negative for rash.   Neurological: Positive for headaches. Negative for weakness.   Hematological: Does not bruise/bleed easily.       Physical Exam     Initial Vitals [05/29/20 2311]   BP Pulse Resp Temp SpO2   (!) 154/84 74 18 97.6 °F (36.4 °C) 99 %      MAP       --         Physical Exam    Constitutional: She appears well-developed and well-nourished. She is not diaphoretic. No distress.   HENT:   Head: Normocephalic and atraumatic.   Mouth/Throat: Oropharynx is clear and moist.   Eyes: Pupils are equal, round, and reactive to light.   Neck:   C-collar in placed   Cardiovascular: Normal rate and regular rhythm.   Pulmonary/Chest: No respiratory distress.   Abdominal: There is no tenderness.   Neurological: She is alert and oriented to person, place, and time.   Decreased sensation in RUE and LLE. 5/5 strength BUE/BLE. No dysmetria. Negative pronator drift.   Skin: Skin is warm and dry. No rash noted. No erythema.         ED Course   Procedures  Labs Reviewed   CBC W/ AUTO DIFFERENTIAL - Abnormal; Notable for the following components:       Result Value    Mean Corpuscular Volume 81 (*)     Mean Corpuscular Hemoglobin 25.7 (*)     Mean Corpuscular Hemoglobin Conc 31.8 (*)     Platelets 404 (*)     Lymph% 50.3 (*)     All other components within normal limits   COMPREHENSIVE METABOLIC PANEL - Abnormal; Notable for the following components:    CO2 22 (*)     All other components within normal limits   URINALYSIS, REFLEX TO URINE CULTURE - Abnormal; Notable for the following components:    Specific Gravity, UA 1.000 (*)     Occult Blood UA 1+ (*)     Nitrite, UA Positive (*)     All other components within normal limits    Narrative:     Preferred Collection Type->Urine, Clean Catch   ALCOHOL,MEDICAL (ETHANOL) - Abnormal; Notable for the following components:    Alcohol, Medical, Serum 319 (*)      All other components within normal limits    Narrative:     Alcohol critical result(s) called and verbal readback obtained from   Kinsey Shena  by [facility] 05/30/2020 00:57   URINALYSIS MICROSCOPIC - Abnormal; Notable for the following components:    Bacteria Many (*)     All other components within normal limits    Narrative:     Preferred Collection Type->Urine, Clean Catch   SARS-COV-2 RNA AMPLIFICATION, QUAL   B-TYPE NATRIURETIC PEPTIDE   LIPASE   TROPONIN I   DRUG SCREEN PANEL, URINE EMERGENCY    Narrative:     Preferred Collection Type->Urine, Clean Catch          Imaging Results          MRI Lumbar Spine Without Contrast (In process)                MRI Thoracic Spine Without Contrast (In process)                MRI Cervical Spine Without Contrast (In process)  Result time 05/30/20 05:22:01               X-Ray Cervical Spine 2 or 3 Views (Final result)  Result time 05/30/20 03:44:45    Final result by Edi Pool MD (05/30/20 03:44:45)                 Impression:      See above.      Electronically signed by: Edi Pool MD  Date:    05/30/2020  Time:    03:44             Narrative:    EXAMINATION:  XR CERVICAL SPINE 2 OR 3 VIEWS    CLINICAL HISTORY:  neck fracture;    TECHNIQUE:  AP, lateral and open mouth views of the cervical spine were performed.    COMPARISON:  CT cervical spine, 05/29/2020.    FINDINGS:  Examination is limited by suboptimal positioning and obscuration of the mid and lower cervical spine by the patient's shoulders.  Sagittal alignment is grossly preserved.  Acute fracture involving the right side of the arch at C1 is much better characterized on prior CT.  No new acute fracture is identified.  There are moderate multilevel degenerative changes throughout the visualized cervical spine.                               CT Chest Abdomen Pelvis With Contrast (Final result)  Result time 05/30/20 01:56:06    Final result by Isacc Conklin MD (05/30/20 01:56:06)                  Impression:      1.  No CT evidence of acute intrathoracic or intra-abdominal abnormality.    2.  Incidentally noted 0.4 cm right middle lobe pulmonary micronodule.  For a solid nodule <6 mm, Fleischner Society 2017 guidelines recommend no routine follow up for a low risk patient, or follow-up with non-contrast chest CT at 12 months in a high risk patient.    3.  Moderate urinary bladder distention.    4.  Additional incidental findings as above      Electronically signed by: Isacc Conklin MD  Date:    05/30/2020  Time:    01:56             Narrative:    EXAMINATION:  CT CHEST ABDOMEN PELVIS WITH CONTRAST (XPD)    CLINICAL HISTORY:  Chest-abdomen-pelvis trauma, moderate, blunt;    TECHNIQUE:  Low dose axial images, sagittal and coronal reformations were obtained from the thoracic inlet to the pubic symphysis following the IV administration of 70 mL of Omnipaque 350 contrast.  .  No oral contrast was administered.    COMPARISON:  None    FINDINGS:  Examination of the vascular and soft tissue structures at the base of the neck is unremarkable.    The thoracic aorta maintains normal caliber, contour, and course with mild atherosclerotic calcification within its course.  No evidence of aortic aneurysm or dissection.  The heart is not enlarged and there is no evidence of pericardial effusion.    The esophagus maintains a normal course and caliber. There is no axillary, mediastinal, or hilar lymphadenopathy.    The trachea is midline, the proximal airways are patent and the lungs are symmetrically expanded.   Examination of the lung fields demonstrates no evidence of pneumothorax.  There is a 0.4 cm right middle lobe pulmonary micronodule.  There is bibasilar dependent atelectasis.  No large confluent airspace consolidation or pleural effusion.    The liver is mildly enlarged.  No focal hepatic abnormality identified.  The gallbladder shows no evidence of stones or pericholecystic fluid.  There is no intra-or  extrahepatic biliary ductal dilatation.    The stomach, spleen, pancreas, and adrenal glands are unremarkable.    The kidneys are normal in size and location and concentrate and excrete contrast properly on delayed imaging.  There is no evidence of hydronephrosis.  The ureters appear normal in course and caliber without evidence of ureteral dilatation. Urinary bladder is moderately distended.  No abnormal bladder wall thickening appreciated.  There is a presumed uterus which appears deviated to the left.  There is no significant free fluid within the pelvis.    The abdominal aorta is normal in course and caliber with atherosclerotic calcification along its course.  There is no retroperitoneal hematoma.    The visualized loops of small and large bowel show no evidence of obstruction or inflammation.  There is a small fat containing umbilical hernia.  The appendix appears within normal limits.  There is no ascites, portal venous gas, or free intraperitoneal air.    There are degenerative changes and patchy sclerosis of the pubic symphysis and bilateral sacroiliac joints.  There is a chronic healed deformity of the left lateral 6th rib.  There are degenerative changes of the spine.  Small intramuscular lipoma incidentally noted within the proximal left sartorius muscle.  The extraperitoneal soft tissues are unremarkable.                               CT Head Without Contrast (Final result)  Result time 05/30/20 00:11:25    Final result by Mari Cordoba MD (05/30/20 00:11:25)                 Impression:      No acute intracranial abnormality detected. Left parietooccipital cephalohematoma.    Acute traumatic fractures involving the right anterior arch and right posterior arch of C1.  Spondylitic changes.    Findings called to Dr. Vizcaino at 00:05 on 05/30/2020.      Electronically signed by: Mari Cordoba  Date:    05/30/2020  Time:    00:11             Narrative:    EXAMINATION:  CT OF THE HEAD WITHOUT AND  CT CERVICAL SPINE    CLINICAL HISTORY:  Head trauma, headache;; C-spine trauma, NEXUS/CCR positive, +risk factor(s);    TECHNIQUE:  5 mm unenhanced axial images were obtained from the skull base to the vertex.  1.25 mm axial images were obtained through the cervical spine.    COMPARISON:  None.    FINDINGS:  CT head: A left parietooccipital cephalohematoma is noted.  The ventricles, basal cisterns, and cortical sulci are within normal limits for patient's stated age. There is no acute intracranial hemorrhage, territorial infarct or mass effect, or midline shift. In the visualized paranasal sinuses, there is mild bilateral maxillary sinus mucoperiosteal thickening.    CT cervical spine: There is <normal alignment of the cervical spine>.  Acute traumatic fractures of the right anterior arch of C1 and the right posterior arch of C1 present.  Multilevel degenerative changes spine is present.                               CT Cervical Spine Without Contrast (Final result)  Result time 05/30/20 00:11:25    Final result by Mari Cordoba MD (05/30/20 00:11:25)                 Impression:      No acute intracranial abnormality detected. Left parietooccipital cephalohematoma.    Acute traumatic fractures involving the right anterior arch and right posterior arch of C1.  Spondylitic changes.    Findings called to Dr. Vizcaino at 00:05 on 05/30/2020.      Electronically signed by: Mair Cordoba  Date:    05/30/2020  Time:    00:11             Narrative:    EXAMINATION:  CT OF THE HEAD WITHOUT AND CT CERVICAL SPINE    CLINICAL HISTORY:  Head trauma, headache;; C-spine trauma, NEXUS/CCR positive, +risk factor(s);    TECHNIQUE:  5 mm unenhanced axial images were obtained from the skull base to the vertex.  1.25 mm axial images were obtained through the cervical spine.    COMPARISON:  None.    FINDINGS:  CT head: A left parietooccipital cephalohematoma is noted.  The ventricles, basal cisterns, and cortical sulci are  within normal limits for patient's stated age. There is no acute intracranial hemorrhage, territorial infarct or mass effect, or midline shift. In the visualized paranasal sinuses, there is mild bilateral maxillary sinus mucoperiosteal thickening.    CT cervical spine: There is <normal alignment of the cervical spine>.  Acute traumatic fractures of the right anterior arch of C1 and the right posterior arch of C1 present.  Multilevel degenerative changes spine is present.                                 Medical Decision Making:   History:   Old Medical Records: I decided to obtain old medical records.  Old Records Summarized: records from clinic visits.       <> Summary of Records: CTH, CT c-spine, and CT chest/abd/pelv performed at [facility] ED. Imaging significant for acute traumatic fractures involving the right anterior arch and right posterior arch of C1  Clinical Tests:   Lab Tests: Ordered and Reviewed  Radiological Study: Reviewed  Medical Tests: Reviewed  Other:   I have discussed this case with another health care provider.       <> Summary of the Discussion: Ortho spine         APC / Resident Notes:   54 y.o. female with medical history of arthritis, HTN presenting to the ED via transfer from [facility] for Ortho spine evaluation of C1 fracture after fall earlier today. OS ED labs and imaging reviewed. ETOH noted to be elevated 319.    5:46 AM - Jose Schrader PA-C  Ortho spine consulted and evaluated patient at bedside. Additional MRI imaging ordered of the C/T/L spine which is pending at this time. Patient admitted to Ortho spine service for ongoing management. I have discussed the care of this patient with my supervising physician.            Attending Attestation:     Physician Attestation Statement for NP/PA:   I discussed this assessment and plan of this patient with the NP/PA, but I did not personally examine the patient. The face to face encounter was performed by the NP/PA.                                Clinical Impression:       ICD-10-CM ICD-9-CM   1. Closed nondisplaced fracture of first cervical vertebra, unspecified fracture morphology, initial encounter S12.001A 805.01   2. Syncope R55 780.2   3. Alcoholic intoxication with complication F10.929 305.00         Disposition:   Disposition: Admitted  Condition: Fair     ED Disposition Condition    Observation                           Jose Schrader PA-C  05/30/20 0550       Jose Schrader PA-C  05/30/20 0556       Shawna Rodriguez MD  05/30/20 [id number]

## 2020-05-30 NOTE — ASSESSMENT & PLAN NOTE
Patient is a 54-year-old female with a type 3 C1 fracture involving right anterior and posterior arches.  No neurological deficits on exam.  MRI ordered to evaluate ligamentous integrity to assess cervical spine stability.  Will follow MRI for further planning.  Patient is to remain in C-collar at this time.

## 2020-05-30 NOTE — SUBJECTIVE & OBJECTIVE
"Past Medical History:   Diagnosis Date    Arthritis     Hypertension        Past Surgical History:   Procedure Laterality Date     SECTION      FRACTURE SURGERY      Left leg        Review of patient's allergies indicates:  No Known Allergies    Current Facility-Administered Medications   Medication    ondansetron injection 4 mg     Current Outpatient Medications   Medication Sig    amLODIPine (NORVASC) 10 MG tablet Take 1 tablet (10 mg total) by mouth once daily.    albuterol (PROVENTIL/VENTOLIN HFA) 90 mcg/actuation inhaler Inhale 2 puffs into the lungs every 6 (six) hours as needed for Wheezing. Rescue     Family History     None        Tobacco Use    Smoking status: Current Every Day Smoker     Packs/day: 0.50     Types: Cigarettes    Smokeless tobacco: Never Used   Substance and Sexual Activity    Alcohol use: Yes     Comment: occasionally    Drug use: No    Sexual activity: Not on file     Review of Systems   Constitution: Negative for malaise/fatigue.   Eyes: Negative for vision loss in left eye.   Cardiovascular: Negative for near-syncope.   Respiratory: Negative for sputum production.    Endocrine: Negative for heat intolerance.   Skin: Negative for suspicious lesions.   Musculoskeletal:        See HPI   Gastrointestinal: Negative for abdominal pain.   Genitourinary: Negative for dysuria.   Neurological: Negative for headaches.        See HPI      Objective:     Vital Signs (Most Recent):  Temp: 97.6 °F (36.4 °C) (20 2311)  Pulse: 78 (20 0126)  Resp: 18 (20 0126)  BP: 124/73 (20 0126)  SpO2: 96 % (20 0126) Vital Signs (24h Range):  Temp:  [97.6 °F (36.4 °C)] 97.6 °F (36.4 °C)  Pulse:  [74-78] 78  Resp:  [18] 18  SpO2:  [96 %-99 %] 96 %  BP: (124-154)/(73-84) 124/73     Weight: 81.6 kg (180 lb)  Height: 5' 7" (170.2 cm)  Body mass index is 28.19 kg/m².    No intake or output data in the 24 hours ending 20 0418    Ortho/SPM Exam    PHYSICAL " EXAM:  Awake/Alert/Oriented x3, No acute distress, Afebrile, Vital signs stable  cephalohematoma 2 left lateral head  Palpable distal pulses  Good inspiratory effort with unlaboured breathing      C collar in place  No skin breakdown or lesions noted to posterior spine  Patient reports diffuse tenderness to palpation along CTL spine  No palpable step-offs      Motor             RIGHT  LEFT  Iliopsoas (L2,3)       5/5    5/5  Quadriceps (L3,4)      5/5    5/5   Tibialis Anterior (L4.5)         5/5    5/5   Extensor Halucis Longus (L5)    5/5    5/5     Gastrocnemius/Soleus (S1)     5/5    5/5  Flexor Hallucis Longus(S2)     5/5    5/5    Sensation (a=absent, i=impaired, n=normal)       RIGHT   LEFT  L2 dermatome       n     n  L3 dermatome       n     n  L4 dermatome       n     n  L5 dermatome       n     n  S1 dermatome       n     n  S2 dermatome       n     n    Reflexes        RIGHT  LEFT  Patellar       2+     2+  Achilles       2+     2+  Babinski      neg    neg  Clonus          neg    neg        Motor            RIGHT  LEFT  Deltoid(C5)        5/5    5/5  Biceps(C5)         5/5    5/5  Extensor Carpi Radialis Longus(C6)    5/5    5/5   Triceps(C7)       5/5    5/5     Flexor Carpi Radialis(C7)     5/5    5/5  Flexor Digitorum Superficialis(C8)    5/5    5/5  Interossei(T1)       5/5    5/5     Sensation (a=absent, i=impaired, n=normal)       RIGHT  LEFT  C5 dermatome       n     n  C6 dermatome       n     n  C7 dermatome       n     n  C8 dermatome       n     n  T1 dermatome       n     n    Reflexes       RIGHT  LEFT  Triceps        2+     2+  Biceps         2+     2+  Brachioradialis       2+           2+  Hoffmans's       neg    neg      Significant Labs: All pertinent labs within the past 24 hours have been reviewed.    Significant Imaging: I have reviewed and interpreted all pertinent imaging results/findings.     CT cervical spine shows right anterior and posterior arch fractures of C1 with minimal  displacement    MRI pending

## 2020-05-31 VITALS
RESPIRATION RATE: 18 BRPM | HEART RATE: 90 BPM | SYSTOLIC BLOOD PRESSURE: 129 MMHG | TEMPERATURE: 97 F | DIASTOLIC BLOOD PRESSURE: 78 MMHG | WEIGHT: 180 LBS | OXYGEN SATURATION: 93 % | BODY MASS INDEX: 28.25 KG/M2 | HEIGHT: 67 IN

## 2020-05-31 PROCEDURE — 97161 PT EVAL LOW COMPLEX 20 MIN: CPT

## 2020-05-31 PROCEDURE — 97530 THERAPEUTIC ACTIVITIES: CPT

## 2020-05-31 PROCEDURE — 97165 OT EVAL LOW COMPLEX 30 MIN: CPT

## 2020-05-31 PROCEDURE — 97535 SELF CARE MNGMENT TRAINING: CPT | Mod: 59

## 2020-05-31 PROCEDURE — 94761 N-INVAS EAR/PLS OXIMETRY MLT: CPT

## 2020-05-31 PROCEDURE — G0378 HOSPITAL OBSERVATION PER HR: HCPCS

## 2020-05-31 RX ORDER — HYDROCODONE BITARTRATE AND ACETAMINOPHEN 5; 325 MG/1; MG/1
1 TABLET ORAL EVERY 6 HOURS PRN
Qty: 10 TABLET | Refills: 0 | Status: SHIPPED | OUTPATIENT
Start: 2020-05-31

## 2020-05-31 NOTE — ASSESSMENT & PLAN NOTE
Patient is a 54-year-old female with a type 3 C1 fracture involving right anterior and posterior arches.    Pain control: multimodal  PT/OT: WBAT BLE, in c collar at all time  DVT PPx: FCDs at all times when not ambulating  Abx: none  Labs: none  Drain: none  Perez: none    Dispo: f/u PT recs, likely home, today

## 2020-05-31 NOTE — PLAN OF CARE
Patient tolerated PT session well. She ambulated 150ft with supervision and no AD. She would benefit from  PT to assess patient safety at home.     Problem: Physical Therapy Goal  Goal: Physical Therapy Goal  Description  Goals to be met by: 2020    Patient will increase functional independence with mobility by performin. Supine to sit with [place]  2. Sit to stand transfer with [place]  3. Gait  x400 feet with [place]      Outcome: Ongoing, Progressing

## 2020-05-31 NOTE — PT/OT/SLP EVAL
Occupational Therapy   Evaluation    Name: Jayde Kinney  MRN: [id number]  Admitting Diagnosis:  C1 cervical fracture     * No surgery found *    Recommendations:     Discharge Recommendations: home health OT  Discharge Equipment Recommendations:  none  Barriers to discharge:  None    Assessment:     Jayde Kinney is a 54 y.o. female with a medical diagnosis of C1 cervical fracture.  She presents with a decline in occupational participation and functional mobility following fall. She is agreeable to participate in evaluation and tolerates session well. Performance deficits affecting function: weakness, impaired endurance, impaired self care skills, impaired functional mobilty, gait instability, impaired balance, orthopedic precautions, pain.      Rehab Prognosis: Good; patient would benefit from acute skilled OT services to address these deficits and reach maximum level of function.       Plan:     Patient to be seen 3 x/week to address the above listed problems via self-care/home management, therapeutic activities, therapeutic exercises  · Plan of Care Expires: 06/30/20  · Plan of Care Reviewed with: patient    Subjective     Chief Complaint: Headache  Patient/Family Comments/goals: To feel better and return home    Occupational Profile:  Living Environment: Patient lives with her  in a [facility] with 0 CICI. Her bathroom has a tub/shower combination.  Previous level of function: Independent PTA  Roles and Routines: Wife, mother. Patient does not drive or work. She enjoys hanging out with family.  Equipment Used at Home:  grab bar, walker, rolling  Assistance upon Discharge: /family available to assist (patient has 8 adult children)    Pain/Comfort:  Pain Rating 1: 2/10  Location 1: head  Pain Addressed 1: Reposition, Distraction, Nurse notified  Pain Rating Post-Intervention 1: 2/10    Patients cultural, spiritual, Congregational conflicts given the current situation: no    Objective:     Communicated with: RN  prior to session.  Patient found HOB elevated with cervical collar(hep lock IV) upon OT entry to room.    General Precautions: Standard, fall   Orthopedic Precautions:spinal precautions   Braces: Cervical collar(on at all times)     Occupational Performance:    Bed Mobility:    · Patient completed Supine to Sit to R side EOB with supervision  · Patient completed Scooting anteriorly to EOB for foot placement on floor with supervision    Functional Mobility/Transfers:  · Patient completed Sit <> Stand Transfer with supervision with no assistive device   · Functional Mobility: Patient completed functional mobility ~150' with SPV and no AD. No LOB/SOB noted.    Activities of Daily Living:  · Grooming: supervision Patient participated in oral hygiene and a face wash with a washcloth while standing at the sink with SPV and education on proper technique for adherence to cervical precautions.  · Upper Body Dressing: supervision Patient donned hospital gown as a robe while sitting EOB with SPV.    Cognitive/Visual Perceptual:  Cognitive/Psychosocial Skills:     -       Oriented to: Person, Place, Time and Situation   -       Follows Commands/attention:Follows multistep  commands    Physical Exam:  Upper Extremity Range of Motion:     -       Right Upper Extremity: WNL  -       Left Upper Extremity: WNL  Upper Extremity Strength:    -       Right Upper Extremity: DNT 2' spinal precautions but observed to be WFL  -       Left Upper Extremity: DNT 2' spinal precautions but observed to be WFL   Strength:    -       Right Upper Extremity: WFL  -       Left Upper Extremity: WFL  Fine Motor Coordination:    -       Intact  Left hand thumb/finger opposition skills and Right hand thumb/finger opposition skills    AMPAC 6 Click ADL:  AMPAC Total Score: 20    Treatment & Education:  Role of OT/evaluation  Educated on spinal/cervical precautions  Educated on using c-collar at all times  Call button for  assistance  Education:    Patient left up in chair with all lines intact, call button in reach and RN notified    GOALS:   Multidisciplinary Problems     Occupational Therapy Goals        Problem: Occupational Therapy Goal    Goal Priority Disciplines Outcome Interventions   Occupational Therapy Goal     OT, PT/OT Ongoing, Progressing    Description:  Goals to be met by: 20     Patient will increase functional independence with ADLs by performing:    UE Dressing with Supervision.  LE Dressing with Supervision and AE PRN.  Toileting from toilet with Supervision for hygiene and clothing management.   Toilet transfer to toilet with Supervision.  Verbalize 3/3 spinal precautions without verbal cueing.                      History:     Past Medical History:   Diagnosis Date    Arthritis     Hypertension          Past Surgical History:   Procedure Laterality Date     SECTION      FRACTURE SURGERY      Left leg        Time Tracking:     OT Date of Treatment: 20  OT Start Time: 841  OT Stop Time: 857  OT Total Time (min): 16 min    Billable Minutes:Evaluation 8 minutes  Self Care/Home Management 8 minutes    Jimena Cano OT  2020

## 2020-05-31 NOTE — PT/OT/SLP EVAL
Physical Therapy Evaluation and Treatment     Patient Name:  Jayde Kinney   MRN:  [id number]    Recommendations:     Discharge Recommendations:  home with home health   Discharge Equipment Recommendations: none   Barriers to discharge: None    Assessment:     Jayde Kinney is a 54 y.o. female admitted with a medical diagnosis of C1 cervical fracture after a fall at home. She presents with the following impairments/functional limitations:  weakness, impaired endurance, impaired self care skills, impaired functional mobilty, gait instability, impaired balance, pain, orthopedic precautions. Patient tolerated PT session well. She ambulated 150ft with supervision and no AD. She would benefit from  PT to assess patient safety at home.     Rehab Prognosis: Good; patient would benefit from acute skilled PT services to address these deficits and reach maximum level of function.    Recent Surgery: * No surgery found *      Plan:     During this hospitalization, patient to be seen 5 x/week to address the identified rehab impairments via gait training, therapeutic activities, therapeutic exercises and progress toward the following goals:    · Plan of Care Expires:  06/07/20    Subjective     Chief Complaint: Headache.   Patient/Family Comments/goals: To go home.   Pain/Comfort:  · Pain Rating 1: 2/10  · Location 1: (headache)  · Pain Addressed 1: Nurse notified, Distraction, Reposition    Living Environment:  Patient lives with her  in a H with no steps to enter. She has 8 children that can all assist as needed. Prior to admission, patients level of function was independent for functional mobility. Equipment used at home: rollator. Upon discharge, patient will have assistance from  and children.    Objective:     Communicated with RN prior to session.  Patient found supine with cervical collar  upon PT entry to room.    General Precautions: Standard, fall   Orthopedic Precautions:spinal precautions    Braces: Cervical collar(on at all times)     Exams:  · Cognitive Exam:  Patient is oriented to Person, Place, Time and Situation  · Sensation:    · -       Intact  · RLE ROM: WFL  · RLE Strength: WFL  · LLE ROM: WFL  · LLE Strength: WFL    Functional Mobility:  · Bed Mobility:     · Scooting: supervision  · Supine to Sit: supervision  · Transfers:     · Sit to Stand:  supervision with no AD  · Gait:  Patient ambulated 150ft with supervision and no AD. No LOB or SOB noted.       Therapeutic Activities and Exercises:  Patient educated in:  -PT role and POC  -safety with transfers including hand placement  -OOB activity to maximize recovery including ambulating with nursing staff assistance to bathroom and in the hallway   -spinal precautions (no bending, no lifting, and no twisting)   -must wear C-collar at all times    AM-PAC 6 CLICK MOBILITY  Total Score:23     Patient left up in chair with all lines intact, call button in reach, and RN notified.    GOALS:   Multidisciplinary Problems     Physical Therapy Goals        Problem: Physical Therapy Goal    Goal Priority Disciplines Outcome Goal Variances Interventions   Physical Therapy Goal     PT, PT/OT Ongoing, Progressing     Description:  Goals to be met by: 2020    Patient will increase functional independence with mobility by performin. Supine to sit with [place]  2. Sit to stand transfer with [place]  3. Gait  x400 feet with [place]                       History:     Past Medical History:   Diagnosis Date    Arthritis     Hypertension        Past Surgical History:   Procedure Laterality Date     SECTION      FRACTURE SURGERY      Left leg        Time Tracking:     PT Received On: 20  PT Start Time: 0841     PT Stop Time: 0857  PT Total Time (min): 16 min     Billable Minutes: Evaluation 8 and Therapeutic Activity 8      Kalina Santa, PT  2020

## 2020-05-31 NOTE — PLAN OF CARE
Problem: Occupational Therapy Goal  Goal: Occupational Therapy Goal  Description  Goals to be met by: 6/14/20     Patient will increase functional independence with ADLs by performing:    UE Dressing with Supervision.  LE Dressing with Supervision and AE PRN.  Toileting from toilet with Supervision for hygiene and clothing management.   Toilet transfer to toilet with Supervision.  Verbalize 3/3 spinal precautions without verbal cueing.     Outcome: Ongoing, Progressing    OT evaluation completed and POC established.  Jimena Cano OT  5/31/2020

## 2020-05-31 NOTE — SUBJECTIVE & OBJECTIVE
"Principal Problem:<principal problem not specified>    Principal Orthopedic Problem: C1 fracture    Interval History: Pt seen and examined at bedside. NAEO. Pain controlled. Plans to work with PT today.    Review of patient's allergies indicates:  No Known Allergies    Current Facility-Administered Medications   Medication    acetaminophen tablet 650 mg    oxyCODONE immediate release tablet 5 mg     Objective:     Vital Signs (Most Recent):  Temp: 98.7 °F (37.1 °C) (05/31/20 0337)  Pulse: 83 (05/31/20 0337)  Resp: 16 (05/31/20 0337)  BP: (!) 139/93 (05/31/20 0337)  SpO2: 95 % (05/31/20 0337) Vital Signs (24h Range):  Temp:  [97.3 °F (36.3 °C)-98.7 °F (37.1 °C)] 98.7 °F (37.1 °C)  Pulse:  [74-86] 83  Resp:  [16-18] 16  SpO2:  [95 %-98 %] 95 %  BP: (135-171)/(77-97) 139/93     Weight: 81.6 kg (180 lb)  Height: 5' 7" (170.2 cm)  Body mass index is 28.19 kg/m².    No intake or output data in the 24 hours ending 05/31/20 0752    Ortho/SPM Exam    NAD  Aspen collar in place  5/5 strength in bilateral upper and lower extremities  Intact sensation to light touch in bilateral upper and lower extremities  2+ pulses throughout        Significant Labs: All pertinent labs within the past 24 hours have been reviewed.    Significant Imaging: I have reviewed and interpreted all pertinent imaging results/findings.  "

## 2020-05-31 NOTE — PROGRESS NOTES
"[facility]  Orthopedics  Progress Note    Patient Name: Jayde Kinney  MRN: [id number]  Admission Date: 5/29/2020  Hospital Length of Stay: 0 days  Attending Provider: Tod Lee MD  Primary Care Provider: To Obtain Unable    Subjective:     Principal Problem:<principal problem not specified>    Principal Orthopedic Problem: C1 fracture    Interval History: Pt seen and examined at bedside. NAEO. Pain controlled. Plans to work with PT today.    Review of patient's allergies indicates:  No Known Allergies    Current Facility-Administered Medications   Medication    acetaminophen tablet 650 mg    oxyCODONE immediate release tablet 5 mg     Objective:     Vital Signs (Most Recent):  Temp: 98.7 °F (37.1 °C) (05/31/20 0337)  Pulse: 83 (05/31/20 0337)  Resp: 16 (05/31/20 0337)  BP: (!) 139/93 (05/31/20 0337)  SpO2: 95 % (05/31/20 0337) Vital Signs (24h Range):  Temp:  [97.3 °F (36.3 °C)-98.7 °F (37.1 °C)] 98.7 °F (37.1 °C)  Pulse:  [74-86] 83  Resp:  [16-18] 16  SpO2:  [95 %-98 %] 95 %  BP: (135-171)/(77-97) 139/93     Weight: 81.6 kg (180 lb)  Height: 5' 7" (170.2 cm)  Body mass index is 28.19 kg/m².    No intake or output data in the 24 hours ending 05/31/20 0752    Ortho/SPM Exam    NAD  Aspen collar in place  5/5 strength in bilateral upper and lower extremities  Intact sensation to light touch in bilateral upper and lower extremities  2+ pulses throughout        Significant Labs: All pertinent labs within the past 24 hours have been reviewed.    Significant Imaging: I have reviewed and interpreted all pertinent imaging results/findings.    Assessment/Plan:     C1 cervical fracture  Patient is a 54-year-old female with a type 3 C1 fracture involving right anterior and posterior arches.    Pain control: multimodal  PT/OT: WBAT BLE, in c collar at all time  DVT PPx: FCDs at all times when not ambulating  Abx: none  Labs: none  Drain: none  Perez: none    Dispo: f/u PT recs, likely home, " today                Jean Rodriguez MD  Orthopedics  [facility]

## 2020-06-01 NOTE — DISCHARGE SUMMARY
"[facility]  Orthopedics  Discharge Summary      Patient Name: Jayde Kinney  MRN: [id number]  Admission Date: 5/29/2020  Hospital Length of Stay: 0 days  Discharge Date and Time:  06/01/2020 2:25 PM  Attending Physician: No att. providers found   Discharging Provider: Jean Rodriguez MD  Primary Care Provider: To Obtain Unable    HPI: 55 yo female presents as consult from [facility] for C1 fracture.  Patient states that she fell 3 days ago and has had neck pain which is not improved since that time.  However, per notes from outside hospital patient previously reported that she blacked out earlier today after drinking a lot of alcohol in response to her friend's death.  She was walking into her house with her  and "blacked out."  called EMS. EMS placed patient in c collar and on spineboard for transport.  Patient reports headache, neck pain and back pain.  Patient reports some decreased sensation in the ring finger.  She denies weakness in bilateral upper lower extremities.  She denies any bowel or bladder changes.  She denies having any difficulty with buttons or keys.  She denies any perianal paresthesias.  She reports ambulating with a walker at baseline since having a tibia shaft fracture in March of 2019.       Hospital Course: Patient admitted for PT/OT. Did well.  Discharge instructions, follow-up appointment, and med rec are below.      Consults (From admission, onward)        Status Ordering Provider     Inpatient consult to Orthopedic Surgery  Once     Provider:  (Not yet assigned)    Completed JORDANA DAVIS          Significant Diagnostic Studies: Labs: All labs within the past 24 hours have been reviewed    Pending Diagnostic Studies:     None        Final Active Diagnoses:    Diagnosis Date Noted POA    PRINCIPAL PROBLEM:  C1 cervical fracture [S12.000A] 05/30/2020 Unknown    Closed nondisplaced fracture of first cervical vertebra [S12.001A] 05/30/2020 Yes    "   Problems Resolved During this Admission:      Discharged Condition: good    Disposition: Home or Self Care    Follow Up:    Patient Instructions:      Diet Adult Regular     Notify your health care provider if you experience any of the following:  temperature >100.4     Notify your health care provider if you experience any of the following:  persistent nausea and vomiting or diarrhea     Notify your health care provider if you experience any of the following:  severe uncontrolled pain     Notify your health care provider if you experience any of the following:  redness, tenderness, or signs of infection (pain, swelling, redness, odor or green/yellow discharge around incision site)     Notify your health care provider if you experience any of the following:  difficulty breathing or increased cough     Notify your health care provider if you experience any of the following:  severe persistent headache     Notify your health care provider if you experience any of the following:  worsening rash     Notify your health care provider if you experience any of the following:  persistent dizziness, light-headedness, or visual disturbances     Notify your health care provider if you experience any of the following:  increased confusion or weakness     Leave dressing on - Keep it clean, dry, and intact until clinic visit     Weight bearing restrictions (specify):   Order Comments: WBAT, remain in C collar at all times until f/u appointment     Medications:  Reconciled Home Medications:      Medication List      START taking these medications    HYDROcodone-acetaminophen 5-325 mg per tablet  Commonly known as:  NORCO  Take 1 tablet by mouth every 6 (six) hours as needed for Pain.        CONTINUE taking these medications    albuterol 90 mcg/actuation inhaler  Commonly known as:  PROVENTIL/VENTOLIN HFA  Inhale 2 puffs into the lungs every 6 (six) hours as needed for Wheezing. Rescue     amLODIPine 10 MG tablet  Commonly known  as:  NORVASC  Take 1 tablet (10 mg total) by mouth once daily.            Jean Rodriguez MD  Orthopedics  [facility]

## 2021-06-15 ENCOUNTER — HOSPITAL ENCOUNTER (EMERGENCY)
Facility: HOSPITAL | Age: 56
Discharge: HOME | End: 2021-06-16
Attending: STUDENT IN AN ORGANIZED HEALTH CARE EDUCATION/TRAINING PROGRAM
Payer: MEDICARE

## 2021-06-15 DIAGNOSIS — J44.1 COPD EXACERBATION: Primary | ICD-10-CM

## 2021-06-15 LAB
ALBUMIN SERPL BCP-MCNC: 3.7 G/DL (ref 3.5–5.2)
ALP SERPL-CCNC: 78 U/L (ref 55–135)
ALT SERPL W/O P-5'-P-CCNC: 10 U/L (ref 10–44)
ANION GAP SERPL CALC-SCNC: 14 MMOL/L (ref 8–16)
AST SERPL-CCNC: 10 U/L (ref 10–40)
BASOPHILS # BLD AUTO: 0.05 K/UL (ref 0–0.2)
BASOPHILS NFR BLD: 0.4 % (ref 0–1.9)
BILIRUB SERPL-MCNC: 0.3 MG/DL (ref 0.1–1)
BNP SERPL-MCNC: 20 PG/ML (ref 0–99)
BUN SERPL-MCNC: 4 MG/DL (ref 6–20)
CALCIUM SERPL-MCNC: 9.1 MG/DL (ref 8.7–10.5)
CHLORIDE SERPL-SCNC: 109 MMOL/L (ref 95–110)
CO2 SERPL-SCNC: 23 MMOL/L (ref 23–29)
CREAT SERPL-MCNC: 0.7 MG/DL (ref 0.5–1.4)
CTP QC/QA: YES
CTP QC/QA: YES
DIFFERENTIAL METHOD: ABNORMAL
EOSINOPHIL # BLD AUTO: 0.3 K/UL (ref 0–0.5)
EOSINOPHIL NFR BLD: 2.2 % (ref 0–8)
ERYTHROCYTE [DISTWIDTH] IN BLOOD BY AUTOMATED COUNT: 13.7 % (ref 11.5–14.5)
EST. GFR  (AFRICAN AMERICAN): >60 ML/MIN/1.73 M^2
EST. GFR  (NON AFRICAN AMERICAN): >60 ML/MIN/1.73 M^2
GLUCOSE SERPL-MCNC: 99 MG/DL (ref 70–110)
HCT VFR BLD AUTO: 38.3 % (ref 37–48.5)
HGB BLD-MCNC: 12.7 G/DL (ref 12–16)
IMM GRANULOCYTES # BLD AUTO: 0.05 K/UL (ref 0–0.04)
IMM GRANULOCYTES NFR BLD AUTO: 0.4 % (ref 0–0.5)
LYMPHOCYTES # BLD AUTO: 4.7 K/UL (ref 1–4.8)
LYMPHOCYTES NFR BLD: 39.4 % (ref 18–48)
MCH RBC QN AUTO: 26.1 PG (ref 27–31)
MCHC RBC AUTO-ENTMCNC: 33.2 G/DL (ref 32–36)
MCV RBC AUTO: 79 FL (ref 82–98)
MONOCYTES # BLD AUTO: 0.9 K/UL (ref 0.3–1)
MONOCYTES NFR BLD: 7.6 % (ref 4–15)
NEUTROPHILS # BLD AUTO: 6 K/UL (ref 1.8–7.7)
NEUTROPHILS NFR BLD: 50 % (ref 38–73)
NRBC BLD-RTO: 0 /100 WBC
PLATELET # BLD AUTO: 437 K/UL (ref 150–450)
PMV BLD AUTO: 9.2 FL (ref 9.2–12.9)
POC MOLECULAR INFLUENZA A AGN: NEGATIVE
POC MOLECULAR INFLUENZA B AGN: NEGATIVE
POTASSIUM SERPL-SCNC: 3.2 MMOL/L (ref 3.5–5.1)
PROT SERPL-MCNC: 7.4 G/DL (ref 6–8.4)
RBC # BLD AUTO: 4.87 M/UL (ref 4–5.4)
SARS-COV-2 RDRP RESP QL NAA+PROBE: NEGATIVE
SODIUM SERPL-SCNC: 146 MMOL/L (ref 136–145)
TROPONIN I SERPL DL<=0.01 NG/ML-MCNC: 0.01 NG/ML (ref 0–0.03)
WBC # BLD AUTO: 11.97 K/UL (ref 3.9–12.7)

## 2021-06-15 PROCEDURE — 84484 ASSAY OF TROPONIN QUANT: CPT | Performed by: STUDENT IN AN ORGANIZED HEALTH CARE EDUCATION/TRAINING PROGRAM

## 2021-06-15 PROCEDURE — 99285 EMERGENCY DEPT VISIT HI MDM: CPT | Mod: 25

## 2021-06-15 PROCEDURE — 85025 COMPLETE CBC W/AUTO DIFF WBC: CPT | Performed by: STUDENT IN AN ORGANIZED HEALTH CARE EDUCATION/TRAINING PROGRAM

## 2021-06-15 PROCEDURE — 83880 ASSAY OF NATRIURETIC PEPTIDE: CPT | Performed by: STUDENT IN AN ORGANIZED HEALTH CARE EDUCATION/TRAINING PROGRAM

## 2021-06-15 PROCEDURE — 25000003 PHARM REV CODE 250: Performed by: STUDENT IN AN ORGANIZED HEALTH CARE EDUCATION/TRAINING PROGRAM

## 2021-06-15 PROCEDURE — U0002 COVID-19 LAB TEST NON-CDC: HCPCS | Performed by: STUDENT IN AN ORGANIZED HEALTH CARE EDUCATION/TRAINING PROGRAM

## 2021-06-15 PROCEDURE — 80053 COMPREHEN METABOLIC PANEL: CPT | Performed by: STUDENT IN AN ORGANIZED HEALTH CARE EDUCATION/TRAINING PROGRAM

## 2021-06-15 PROCEDURE — 87502 INFLUENZA DNA AMP PROBE: CPT

## 2021-06-15 RX ORDER — ACETAMINOPHEN 325 MG/1
650 TABLET ORAL
Status: COMPLETED | OUTPATIENT
Start: 2021-06-15 | End: 2021-06-15

## 2021-06-15 RX ADMIN — ACETAMINOPHEN 650 MG: 325 TABLET ORAL at 10:06

## 2021-06-16 VITALS
HEIGHT: 66 IN | SYSTOLIC BLOOD PRESSURE: 138 MMHG | BODY MASS INDEX: 29.73 KG/M2 | HEART RATE: 99 BPM | WEIGHT: 185 LBS | RESPIRATION RATE: 18 BRPM | TEMPERATURE: 99 F | DIASTOLIC BLOOD PRESSURE: 84 MMHG | OXYGEN SATURATION: 95 %

## 2021-06-16 PROCEDURE — 25000003 PHARM REV CODE 250: Performed by: STUDENT IN AN ORGANIZED HEALTH CARE EDUCATION/TRAINING PROGRAM

## 2021-06-16 PROCEDURE — 94640 AIRWAY INHALATION TREATMENT: CPT | Mod: ER

## 2021-06-16 PROCEDURE — 25000242 PHARM REV CODE 250 ALT 637 W/ HCPCS: Performed by: STUDENT IN AN ORGANIZED HEALTH CARE EDUCATION/TRAINING PROGRAM

## 2021-06-16 PROCEDURE — 63600175 PHARM REV CODE 636 W HCPCS: Performed by: STUDENT IN AN ORGANIZED HEALTH CARE EDUCATION/TRAINING PROGRAM

## 2021-06-16 RX ORDER — IPRATROPIUM BROMIDE AND ALBUTEROL SULFATE 2.5; .5 MG/3ML; MG/3ML
3 SOLUTION RESPIRATORY (INHALATION)
Status: COMPLETED | OUTPATIENT
Start: 2021-06-16 | End: 2021-06-16

## 2021-06-16 RX ORDER — PREDNISONE 20 MG/1
60 TABLET ORAL
Status: DISCONTINUED | OUTPATIENT
Start: 2021-06-16 | End: 2021-06-16

## 2021-06-16 RX ORDER — PREDNISONE 20 MG/1
60 TABLET ORAL
Status: COMPLETED | OUTPATIENT
Start: 2021-06-16 | End: 2021-06-16

## 2021-06-16 RX ORDER — PREDNISONE 20 MG/1
40 TABLET ORAL
Status: DISCONTINUED | OUTPATIENT
Start: 2021-06-16 | End: 2021-06-16

## 2021-06-16 RX ORDER — ALBUTEROL SULFATE 90 UG/1
2 AEROSOL, METERED RESPIRATORY (INHALATION) EVERY 6 HOURS PRN
Qty: 6.7 G | Refills: 0 | Status: SHIPPED | OUTPATIENT
Start: 2021-06-16 | End: 2022-07-19 | Stop reason: SDUPTHER

## 2021-06-16 RX ORDER — PREDNISONE 20 MG/1
40 TABLET ORAL DAILY
Qty: 10 TABLET | Refills: 0 | Status: SHIPPED | OUTPATIENT
Start: 2021-06-16 | End: 2021-06-21

## 2021-06-16 RX ADMIN — IPRATROPIUM BROMIDE AND ALBUTEROL SULFATE 3 ML: .5; 3 SOLUTION RESPIRATORY (INHALATION) at 12:06

## 2021-06-16 RX ADMIN — POTASSIUM BICARBONATE 20 MEQ: 391 TABLET, EFFERVESCENT ORAL at 12:06

## 2021-06-16 RX ADMIN — PREDNISONE 60 MG: 20 TABLET ORAL at 01:06

## 2021-09-19 ENCOUNTER — HOSPITAL ENCOUNTER (EMERGENCY)
Facility: HOSPITAL | Age: 56
Discharge: HOME | End: 2021-09-19
Attending: EMERGENCY MEDICINE
Payer: MEDICARE

## 2021-09-19 VITALS
RESPIRATION RATE: 19 BRPM | SYSTOLIC BLOOD PRESSURE: 137 MMHG | OXYGEN SATURATION: 96 % | BODY MASS INDEX: 28.93 KG/M2 | DIASTOLIC BLOOD PRESSURE: 87 MMHG | WEIGHT: 180 LBS | TEMPERATURE: 99 F | HEIGHT: 66 IN | HEART RATE: 80 BPM

## 2021-09-19 DIAGNOSIS — E87.6 HYPOKALEMIA: Primary | ICD-10-CM

## 2021-09-19 DIAGNOSIS — R06.02 SOB (SHORTNESS OF BREATH): ICD-10-CM

## 2021-09-19 DIAGNOSIS — J18.9 PNEUMONIA DUE TO INFECTIOUS ORGANISM, UNSPECIFIED LATERALITY, UNSPECIFIED PART OF LUNG: ICD-10-CM

## 2021-09-19 LAB
ALBUMIN SERPL-MCNC: 3.4 G/DL (ref 3.3–5.5)
ALP SERPL-CCNC: 70 U/L (ref 42–141)
BILIRUB SERPL-MCNC: 0.5 MG/DL (ref 0.2–1.6)
BILIRUBIN, POC UA: NEGATIVE
BLOOD, POC UA: NEGATIVE
BUN SERPL-MCNC: 5 MG/DL (ref 7–22)
CALCIUM SERPL-MCNC: 9.8 MG/DL (ref 8–10.3)
CHLORIDE SERPL-SCNC: 102 MMOL/L (ref 98–108)
CLARITY, POC UA: CLEAR
COLOR, POC UA: YELLOW
CREAT SERPL-MCNC: 0.6 MG/DL (ref 0.6–1.2)
CTP QC/QA: YES
GLUCOSE SERPL-MCNC: 112 MG/DL (ref 73–118)
GLUCOSE, POC UA: NEGATIVE
KETONES, POC UA: NEGATIVE
LEUKOCYTE EST, POC UA: NEGATIVE
NITRITE, POC UA: NEGATIVE
PH UR STRIP: 7.5 [PH]
POC ALT (SGPT): 12 U/L (ref 10–47)
POC AST (SGOT): 26 U/L (ref 11–38)
POC B-TYPE NATRIURETIC PEPTIDE: 12.2 PG/ML (ref 0–100)
POC CARDIAC TROPONIN I: 0 NG/ML
POC PTINR: 1.1 (ref 0.9–1.2)
POC PTWBT: 13.6 SEC (ref 9.7–14.3)
POC TCO2: 28 MMOL/L (ref 18–33)
POTASSIUM BLD-SCNC: 3.5 MMOL/L (ref 3.6–5.1)
PROTEIN, POC UA: NEGATIVE
PROTEIN, POC: 7.3 G/DL (ref 6.4–8.1)
SAMPLE: NORMAL
SAMPLE: NORMAL
SARS-COV-2 RDRP RESP QL NAA+PROBE: NEGATIVE
SODIUM BLD-SCNC: 142 MMOL/L (ref 128–145)
SPECIFIC GRAVITY, POC UA: 1.01
UROBILINOGEN, POC UA: 0.2 E.U./DL

## 2021-09-19 PROCEDURE — 80053 COMPREHEN METABOLIC PANEL: CPT | Mod: ER

## 2021-09-19 PROCEDURE — 96365 THER/PROPH/DIAG IV INF INIT: CPT | Mod: ER

## 2021-09-19 PROCEDURE — 63600175 PHARM REV CODE 636 W HCPCS: Mod: ER | Performed by: EMERGENCY MEDICINE

## 2021-09-19 PROCEDURE — 83880 ASSAY OF NATRIURETIC PEPTIDE: CPT | Mod: ER

## 2021-09-19 PROCEDURE — U0002 COVID-19 LAB TEST NON-CDC: HCPCS | Mod: ER | Performed by: EMERGENCY MEDICINE

## 2021-09-19 PROCEDURE — 87040 BLOOD CULTURE FOR BACTERIA: CPT | Mod: 59 | Performed by: EMERGENCY MEDICINE

## 2021-09-19 PROCEDURE — 84484 ASSAY OF TROPONIN QUANT: CPT | Mod: ER

## 2021-09-19 PROCEDURE — 85025 COMPLETE CBC W/AUTO DIFF WBC: CPT | Mod: ER

## 2021-09-19 PROCEDURE — 85610 PROTHROMBIN TIME: CPT | Mod: ER

## 2021-09-19 PROCEDURE — 25000003 PHARM REV CODE 250: Mod: ER | Performed by: EMERGENCY MEDICINE

## 2021-09-19 PROCEDURE — 99285 EMERGENCY DEPT VISIT HI MDM: CPT | Mod: 25,ER

## 2021-09-19 PROCEDURE — 25500020 PHARM REV CODE 255: Mod: ER | Performed by: EMERGENCY MEDICINE

## 2021-09-19 RX ORDER — BENZONATATE 100 MG/1
200 CAPSULE ORAL
Status: COMPLETED | OUTPATIENT
Start: 2021-09-19 | End: 2021-09-19

## 2021-09-19 RX ORDER — POTASSIUM CHLORIDE 20 MEQ/1
20 TABLET, EXTENDED RELEASE ORAL
Status: COMPLETED | OUTPATIENT
Start: 2021-09-19 | End: 2021-09-19

## 2021-09-19 RX ORDER — BENZONATATE 200 MG/1
200 CAPSULE ORAL 3 TIMES DAILY PRN
Qty: 20 CAPSULE | Refills: 0 | Status: SHIPPED | OUTPATIENT
Start: 2021-09-19 | End: 2021-09-29

## 2021-09-19 RX ORDER — ASPIRIN 325 MG
325 TABLET ORAL
Status: COMPLETED | OUTPATIENT
Start: 2021-09-19 | End: 2021-09-19

## 2021-09-19 RX ORDER — ACETAMINOPHEN 500 MG
1000 TABLET ORAL EVERY 6 HOURS PRN
Qty: 30 TABLET | Refills: 0 | Status: SHIPPED | OUTPATIENT
Start: 2021-09-19

## 2021-09-19 RX ORDER — LORATADINE 10 MG/1
10 TABLET ORAL DAILY
Qty: 60 TABLET | Refills: 0 | Status: SHIPPED | OUTPATIENT
Start: 2021-09-19 | End: 2022-09-19

## 2021-09-19 RX ORDER — FLUTICASONE PROPIONATE 50 MCG
1 SPRAY, SUSPENSION (ML) NASAL 2 TIMES DAILY
Qty: 16 G | Refills: 0 | Status: SHIPPED | OUTPATIENT
Start: 2021-09-19

## 2021-09-19 RX ORDER — AZITHROMYCIN 250 MG/1
250 TABLET, FILM COATED ORAL DAILY
Qty: 6 TABLET | Refills: 0 | Status: SHIPPED | OUTPATIENT
Start: 2021-09-19

## 2021-09-19 RX ADMIN — POTASSIUM CHLORIDE 20 MEQ: 20 TABLET, EXTENDED RELEASE ORAL at 01:09

## 2021-09-19 RX ADMIN — CEFTRIAXONE 1 G: 1 INJECTION, SOLUTION INTRAVENOUS at 01:09

## 2021-09-19 RX ADMIN — ASPIRIN 325 MG ORAL TABLET 325 MG: 325 PILL ORAL at 09:09

## 2021-09-19 RX ADMIN — IOHEXOL 100 ML: 350 INJECTION, SOLUTION INTRAVENOUS at 11:09

## 2021-09-19 RX ADMIN — BENZONATATE 200 MG: 100 CAPSULE ORAL at 09:09

## 2021-09-23 LAB
BACTERIA BLD CULT: NORMAL
BACTERIA BLD CULT: NORMAL

## 2021-12-29 ENCOUNTER — HOSPITAL ENCOUNTER (EMERGENCY)
Facility: HOSPITAL | Age: 56
Discharge: HOME | End: 2021-12-29
Attending: EMERGENCY MEDICINE
Payer: MEDICARE

## 2021-12-29 VITALS
DIASTOLIC BLOOD PRESSURE: 75 MMHG | SYSTOLIC BLOOD PRESSURE: 110 MMHG | TEMPERATURE: 99 F | RESPIRATION RATE: 18 BRPM | BODY MASS INDEX: 30.67 KG/M2 | OXYGEN SATURATION: 96 % | WEIGHT: 190 LBS | HEART RATE: 95 BPM

## 2021-12-29 DIAGNOSIS — N30.01 ACUTE CYSTITIS WITH HEMATURIA: ICD-10-CM

## 2021-12-29 DIAGNOSIS — U07.1 COVID-19 VIRUS INFECTION: Primary | ICD-10-CM

## 2021-12-29 DIAGNOSIS — U07.1 COVID-19 VIRUS DETECTED: ICD-10-CM

## 2021-12-29 LAB
BACTERIA #/AREA URNS HPF: ABNORMAL /HPF
BILIRUB UR QL STRIP: NEGATIVE
CLARITY UR: ABNORMAL
COLOR UR: YELLOW
CTP QC/QA: YES
CTP QC/QA: YES
GLUCOSE UR QL STRIP: NEGATIVE
HGB UR QL STRIP: NEGATIVE
HYALINE CASTS #/AREA URNS LPF: 12 /LPF
KETONES UR QL STRIP: NEGATIVE
LEUKOCYTE ESTERASE UR QL STRIP: ABNORMAL
MICROSCOPIC COMMENT: ABNORMAL
NITRITE UR QL STRIP: NEGATIVE
PH UR STRIP: 6 [PH] (ref 5–8)
POC MOLECULAR INFLUENZA A AGN: NEGATIVE
POC MOLECULAR INFLUENZA B AGN: NEGATIVE
PROT UR QL STRIP: ABNORMAL
RBC #/AREA URNS HPF: 6 /HPF (ref 0–4)
SARS-COV-2 RDRP RESP QL NAA+PROBE: POSITIVE
SP GR UR STRIP: 1.02 (ref 1–1.03)
SQUAMOUS #/AREA URNS HPF: 29 /HPF
URN SPEC COLLECT METH UR: ABNORMAL
UROBILINOGEN UR STRIP-ACNC: ABNORMAL EU/DL
WBC #/AREA URNS HPF: >100 /HPF (ref 0–5)
WBC CLUMPS URNS QL MICRO: ABNORMAL

## 2021-12-29 PROCEDURE — 87088 URINE BACTERIA CULTURE: CPT | Performed by: PHYSICIAN ASSISTANT

## 2021-12-29 PROCEDURE — 25000003 PHARM REV CODE 250: Performed by: PHYSICIAN ASSISTANT

## 2021-12-29 PROCEDURE — 87086 URINE CULTURE/COLONY COUNT: CPT | Performed by: PHYSICIAN ASSISTANT

## 2021-12-29 PROCEDURE — U0002 COVID-19 LAB TEST NON-CDC: HCPCS | Performed by: EMERGENCY MEDICINE

## 2021-12-29 PROCEDURE — 81000 URINALYSIS NONAUTO W/SCOPE: CPT | Performed by: PHYSICIAN ASSISTANT

## 2021-12-29 PROCEDURE — 63600175 PHARM REV CODE 636 W HCPCS: Performed by: PHYSICIAN ASSISTANT

## 2021-12-29 PROCEDURE — 87186 SC STD MICRODIL/AGAR DIL: CPT | Performed by: PHYSICIAN ASSISTANT

## 2021-12-29 PROCEDURE — 99284 EMERGENCY DEPT VISIT MOD MDM: CPT | Mod: 25

## 2021-12-29 PROCEDURE — 87077 CULTURE AEROBIC IDENTIFY: CPT | Performed by: PHYSICIAN ASSISTANT

## 2021-12-29 PROCEDURE — 96372 THER/PROPH/DIAG INJ SC/IM: CPT

## 2021-12-29 RX ORDER — AMOXICILLIN AND CLAVULANATE POTASSIUM 875; 125 MG/1; MG/1
1 TABLET, FILM COATED ORAL 2 TIMES DAILY
Qty: 20 TABLET | Refills: 0 | Status: SHIPPED | OUTPATIENT
Start: 2021-12-29 | End: 2022-01-08

## 2021-12-29 RX ORDER — ACETAMINOPHEN 500 MG
1000 TABLET ORAL
Status: COMPLETED | OUTPATIENT
Start: 2021-12-29 | End: 2021-12-29

## 2021-12-29 RX ADMIN — CEFTRIAXONE 2 G: 1 INJECTION, POWDER, FOR SOLUTION INTRAMUSCULAR; INTRAVENOUS at 01:12

## 2021-12-29 RX ADMIN — ACETAMINOPHEN 1000 MG: 500 TABLET ORAL at 12:12

## 2021-12-29 NOTE — DISCHARGE INSTRUCTIONS

## 2021-12-29 NOTE — ED PROVIDER NOTES
Encounter Date: 2021    SCRIBE #1 NOTE: I, Marthalexi Gallegos, am scribing for, and in the presence of, GISSELLE Thornton.       History     Chief Complaint   Patient presents with    Abdominal Pain     HA, abd pain, diarrhea and poor appetite for the past week    COVID-19 Concerns     JUAREZ, abd pain     56 y.o. female with past medical history of hypertension presenting with one week history of cough with associated rhinorrhea and intermittent abdominal pain. Patient endorses urinary frequency and dysuria. She attempted treatment with BC powder with no relief. No fever, chest pain, shortness of breath, nausea, vomiting, diarrhea, back pain or hematuria. She has no known drug allergies.     The history is provided by the patient. No  was used.     Review of patient's allergies indicates:  No Known Allergies  Past Medical History:   Diagnosis Date    Arthritis     Hypertension      Past Surgical History:   Procedure Laterality Date     SECTION      FRACTURE SURGERY      Left leg      History reviewed. No pertinent family history.  Social History     Tobacco Use    Smoking status: Current Every Day Smoker     Packs/day: 0.50     Types: Cigarettes    Smokeless tobacco: Never Used   Substance Use Topics    Alcohol use: Yes     Comment: occasionally    Drug use: No     Review of Systems   Constitutional: Negative for chills and fever.   HENT: Positive for rhinorrhea. Negative for congestion and sore throat.    Eyes: Negative for visual disturbance.   Respiratory: Positive for cough. Negative for shortness of breath.    Cardiovascular: Negative for chest pain.   Gastrointestinal: Positive for abdominal pain. Negative for diarrhea, nausea and vomiting.   Genitourinary: Positive for dysuria and frequency. Negative for hematuria and vaginal discharge.   Musculoskeletal: Negative for back pain.   Skin: Negative for rash.   Neurological: Negative for headaches.   Psychiatric/Behavioral:  Negative for decreased concentration.       Physical Exam     Initial Vitals [12/29/21 1119]   BP Pulse Resp Temp SpO2   110/75 95 18 98.7 °F (37.1 °C) 96 %      MAP       --         Physical Exam    Nursing note and vitals reviewed.  Constitutional: She appears well-developed and well-nourished. She is not diaphoretic. No distress.   HENT:   Head: Normocephalic and atraumatic.   Mouth/Throat: Oropharynx is clear and moist.   Eyes: Conjunctivae and EOM are normal.   Neck: Neck supple.   Normal range of motion.  Cardiovascular: Normal rate and regular rhythm.   Pulmonary/Chest: Breath sounds normal. No respiratory distress.   Abdominal: Abdomen is soft. There is no abdominal tenderness.   Musculoskeletal:         General: No edema.      Cervical back: Normal range of motion and neck supple.     Neurological: She is alert and oriented to person, place, and time.   Skin: Skin is warm and dry.   Psychiatric: She has a normal mood and affect. Thought content normal.         ED Course   Procedures  Labs Reviewed   URINALYSIS, REFLEX TO URINE CULTURE - Abnormal; Notable for the following components:       Result Value    Appearance, UA Hazy (*)     Protein, UA 1+ (*)     Urobilinogen, UA 2.0-3.0 (*)     Leukocytes, UA 3+ (*)     All other components within normal limits    Narrative:     Specimen Source->Urine   URINALYSIS MICROSCOPIC - Abnormal; Notable for the following components:    RBC, UA 6 (*)     WBC, UA >100 (*)     WBC Clumps, UA Few (*)     Bacteria Many (*)     Hyaline Casts, UA 12 (*)     All other components within normal limits    Narrative:     Specimen Source->Urine   SARS-COV-2 RDRP GENE - Abnormal; Notable for the following components:    POC Rapid COVID Positive (*)     All other components within normal limits   CULTURE, URINE   POCT INFLUENZA A/B MOLECULAR          Imaging Results    None          Medications   acetaminophen tablet 1,000 mg (1,000 mg Oral Given 12/29/21 1200)   cefTRIAXone (ROCEPHIN)  2 g in LIDOcaine HCL 10 mg/ml (1%) IM only syringe (2 g Intramuscular Given 12/29/21 1301)     Medical Decision Making:   History:   Old Medical Records: I decided to obtain old medical records.  ED Management:  Multiple complaints.  COVID-19 positive.  No hypoxia or respiratory distress.  Low suspicion for bacterial pneumonia.  Also has UTI.  No systemic symptoms or pyelonephritis.  Started on antibiotics.  Advising follow-up with PCP. Strict return precautions discussed.  Agreeable to plan.          Scribe Attestation:   Scribe #1: I performed the above scribed service and the documentation accurately describes the services I performed. I attest to the accuracy of the note.                 Clinical Impression:   Final diagnoses:  [U07.1] COVID-19 virus infection (Primary)  [N30.01] Acute cystitis with hematuria          ED Disposition Condition    Discharge Stable        ED Prescriptions     Medication Sig Dispense Start Date End Date Auth. Provider    amoxicillin-clavulanate 875-125mg (AUGMENTIN) 875-125 mg per tablet Take 1 tablet by mouth 2 (two) times daily. for 10 days 20 tablet 12/29/2021 1/8/2022 Luis Summers PA-C        Follow-up Information     Follow up With Specialties Details Why Contact Info    [facility]  Schedule an appointment as soon as possible for a visit in 1 day For reevaluation [address] [place] [address]  [phone]      [facility] Emergency Dept Emergency Medicine Go to  If symptoms worsen [address] [facility] [id number] [phone]          I, Luis Summers PA-C], personally performed the services described in this documentation. All medical record entries made by the scribe were at my direction and in my presence. I have reviewed the chart and agree that the record reflects my personal performance and is accurate and complete.       Luis Summers PA-C  12/29/21 9521

## 2021-12-29 NOTE — Clinical Note
"Jayde Dupreea" Nirmal was seen and treated in our emergency department on 12/29/2021.  She may return to work on 01/03/2022.       If you have any questions or concerns, please don't hesitate to call.      Luis Summers PA-C"

## 2021-12-31 LAB — BACTERIA UR CULT: ABNORMAL

## 2022-07-19 ENCOUNTER — HOSPITAL ENCOUNTER (EMERGENCY)
Facility: HOSPITAL | Age: 57
Discharge: HOME | End: 2022-07-19
Attending: EMERGENCY MEDICINE
Payer: MEDICARE

## 2022-07-19 VITALS
BODY MASS INDEX: 30.53 KG/M2 | SYSTOLIC BLOOD PRESSURE: 122 MMHG | HEIGHT: 66 IN | TEMPERATURE: 98 F | RESPIRATION RATE: 21 BRPM | WEIGHT: 190 LBS | DIASTOLIC BLOOD PRESSURE: 63 MMHG | HEART RATE: 105 BPM | OXYGEN SATURATION: 95 %

## 2022-07-19 DIAGNOSIS — F10.929 ALCOHOLIC INTOXICATION WITH COMPLICATION: ICD-10-CM

## 2022-07-19 DIAGNOSIS — J44.9 CHRONIC OBSTRUCTIVE PULMONARY DISEASE, UNSPECIFIED COPD TYPE: ICD-10-CM

## 2022-07-19 DIAGNOSIS — R11.10 VOMITING, INTRACTABILITY OF VOMITING NOT SPECIFIED, PRESENCE OF NAUSEA NOT SPECIFIED, UNSPECIFIED VOMITING TYPE: Primary | ICD-10-CM

## 2022-07-19 DIAGNOSIS — R07.9 CHEST PAIN: ICD-10-CM

## 2022-07-19 LAB
ALBUMIN SERPL BCP-MCNC: 3.9 G/DL (ref 3.5–5.2)
ALP SERPL-CCNC: 67 U/L (ref 55–135)
ALT SERPL W/O P-5'-P-CCNC: 17 U/L (ref 10–44)
ANION GAP SERPL CALC-SCNC: 16 MMOL/L (ref 8–16)
AST SERPL-CCNC: 16 U/L (ref 10–40)
BASOPHILS # BLD AUTO: 0.05 K/UL (ref 0–0.2)
BASOPHILS NFR BLD: 0.7 % (ref 0–1.9)
BILIRUB SERPL-MCNC: 0.6 MG/DL (ref 0.1–1)
BNP SERPL-MCNC: 22 PG/ML (ref 0–99)
BUN SERPL-MCNC: 4 MG/DL (ref 6–20)
CALCIUM SERPL-MCNC: 9.2 MG/DL (ref 8.7–10.5)
CHLORIDE SERPL-SCNC: 105 MMOL/L (ref 95–110)
CO2 SERPL-SCNC: 22 MMOL/L (ref 23–29)
CREAT SERPL-MCNC: 0.7 MG/DL (ref 0.5–1.4)
CTP QC/QA: YES
DIFFERENTIAL METHOD: ABNORMAL
EOSINOPHIL # BLD AUTO: 0.1 K/UL (ref 0–0.5)
EOSINOPHIL NFR BLD: 1.7 % (ref 0–8)
ERYTHROCYTE [DISTWIDTH] IN BLOOD BY AUTOMATED COUNT: 15.7 % (ref 11.5–14.5)
EST. GFR  (AFRICAN AMERICAN): >60 ML/MIN/1.73 M^2
EST. GFR  (NON AFRICAN AMERICAN): >60 ML/MIN/1.73 M^2
ETHANOL SERPL-MCNC: 214 MG/DL
GLUCOSE SERPL-MCNC: 127 MG/DL (ref 70–110)
HCT VFR BLD AUTO: 42.6 % (ref 37–48.5)
HGB BLD-MCNC: 14 G/DL (ref 12–16)
IMM GRANULOCYTES # BLD AUTO: 0.04 K/UL (ref 0–0.04)
IMM GRANULOCYTES NFR BLD AUTO: 0.6 % (ref 0–0.5)
LIPASE SERPL-CCNC: 18 U/L (ref 4–60)
LYMPHOCYTES # BLD AUTO: 2.1 K/UL (ref 1–4.8)
LYMPHOCYTES NFR BLD: 28.9 % (ref 18–48)
MCH RBC QN AUTO: 25.9 PG (ref 27–31)
MCHC RBC AUTO-ENTMCNC: 32.9 G/DL (ref 32–36)
MCV RBC AUTO: 79 FL (ref 82–98)
MONOCYTES # BLD AUTO: 0.1 K/UL (ref 0.3–1)
MONOCYTES NFR BLD: 2 % (ref 4–15)
NEUTROPHILS # BLD AUTO: 4.7 K/UL (ref 1.8–7.7)
NEUTROPHILS NFR BLD: 66.1 % (ref 38–73)
NRBC BLD-RTO: 0 /100 WBC
PLATELET # BLD AUTO: 340 K/UL (ref 150–450)
PMV BLD AUTO: 9.5 FL (ref 9.2–12.9)
POTASSIUM SERPL-SCNC: 3.1 MMOL/L (ref 3.5–5.1)
PROT SERPL-MCNC: 7.7 G/DL (ref 6–8.4)
RBC # BLD AUTO: 5.4 M/UL (ref 4–5.4)
SARS-COV-2 RDRP RESP QL NAA+PROBE: NEGATIVE
SODIUM SERPL-SCNC: 143 MMOL/L (ref 136–145)
TROPONIN I SERPL DL<=0.01 NG/ML-MCNC: 0.01 NG/ML (ref 0–0.03)
TROPONIN I SERPL DL<=0.01 NG/ML-MCNC: 0.01 NG/ML (ref 0–0.03)
WBC # BLD AUTO: 7.09 K/UL (ref 3.9–12.7)

## 2022-07-19 PROCEDURE — 94761 N-INVAS EAR/PLS OXIMETRY MLT: CPT

## 2022-07-19 PROCEDURE — 83690 ASSAY OF LIPASE: CPT | Performed by: EMERGENCY MEDICINE

## 2022-07-19 PROCEDURE — 63600175 PHARM REV CODE 636 W HCPCS: Performed by: EMERGENCY MEDICINE

## 2022-07-19 PROCEDURE — 96375 TX/PRO/DX INJ NEW DRUG ADDON: CPT

## 2022-07-19 PROCEDURE — 94640 AIRWAY INHALATION TREATMENT: CPT | Mod: XB

## 2022-07-19 PROCEDURE — 25000242 PHARM REV CODE 250 ALT 637 W/ HCPCS: Performed by: EMERGENCY MEDICINE

## 2022-07-19 PROCEDURE — U0002 COVID-19 LAB TEST NON-CDC: HCPCS | Performed by: EMERGENCY MEDICINE

## 2022-07-19 PROCEDURE — 96361 HYDRATE IV INFUSION ADD-ON: CPT

## 2022-07-19 PROCEDURE — 27100098 HC SPACER

## 2022-07-19 PROCEDURE — 80053 COMPREHEN METABOLIC PANEL: CPT | Performed by: EMERGENCY MEDICINE

## 2022-07-19 PROCEDURE — 82077 ASSAY SPEC XCP UR&BREATH IA: CPT | Performed by: EMERGENCY MEDICINE

## 2022-07-19 PROCEDURE — 84484 ASSAY OF TROPONIN QUANT: CPT | Performed by: EMERGENCY MEDICINE

## 2022-07-19 PROCEDURE — 96374 THER/PROPH/DIAG INJ IV PUSH: CPT

## 2022-07-19 PROCEDURE — 25000003 PHARM REV CODE 250: Performed by: EMERGENCY MEDICINE

## 2022-07-19 PROCEDURE — 83880 ASSAY OF NATRIURETIC PEPTIDE: CPT | Performed by: EMERGENCY MEDICINE

## 2022-07-19 PROCEDURE — 99285 EMERGENCY DEPT VISIT HI MDM: CPT | Mod: 25

## 2022-07-19 PROCEDURE — 85025 COMPLETE CBC W/AUTO DIFF WBC: CPT | Performed by: EMERGENCY MEDICINE

## 2022-07-19 RX ORDER — DOXYCYCLINE 100 MG/1
100 CAPSULE ORAL 2 TIMES DAILY
Qty: 20 CAPSULE | Refills: 0 | Status: SHIPPED | OUTPATIENT
Start: 2022-07-19 | End: 2022-07-29

## 2022-07-19 RX ORDER — METHYLPREDNISOLONE SOD SUCC 125 MG
125 VIAL (EA) INJECTION
Status: COMPLETED | OUTPATIENT
Start: 2022-07-19 | End: 2022-07-19

## 2022-07-19 RX ORDER — IPRATROPIUM BROMIDE AND ALBUTEROL SULFATE 2.5; .5 MG/3ML; MG/3ML
3 SOLUTION RESPIRATORY (INHALATION)
Status: COMPLETED | OUTPATIENT
Start: 2022-07-19 | End: 2022-07-19

## 2022-07-19 RX ORDER — BENZONATATE 100 MG/1
100 CAPSULE ORAL 3 TIMES DAILY PRN
Qty: 20 CAPSULE | Refills: 0 | Status: SHIPPED | OUTPATIENT
Start: 2022-07-19 | End: 2022-07-29

## 2022-07-19 RX ORDER — ONDANSETRON 2 MG/ML
4 INJECTION INTRAMUSCULAR; INTRAVENOUS
Status: COMPLETED | OUTPATIENT
Start: 2022-07-19 | End: 2022-07-19

## 2022-07-19 RX ORDER — ALBUTEROL SULFATE 90 UG/1
2 AEROSOL, METERED RESPIRATORY (INHALATION) EVERY 6 HOURS PRN
Qty: 18 G | Refills: 1 | Status: SHIPPED | OUTPATIENT
Start: 2022-07-19 | End: 2022-08-18

## 2022-07-19 RX ORDER — IPRATROPIUM BROMIDE AND ALBUTEROL SULFATE 2.5; .5 MG/3ML; MG/3ML
3 SOLUTION RESPIRATORY (INHALATION) EVERY 20 MIN
Status: COMPLETED | OUTPATIENT
Start: 2022-07-19 | End: 2022-07-19

## 2022-07-19 RX ORDER — MAG HYDROX/ALUMINUM HYD/SIMETH 200-200-20
30 SUSPENSION, ORAL (FINAL DOSE FORM) ORAL ONCE
Status: COMPLETED | OUTPATIENT
Start: 2022-07-19 | End: 2022-07-19

## 2022-07-19 RX ORDER — ALBUTEROL SULFATE 0.83 MG/ML
2.5 SOLUTION RESPIRATORY (INHALATION) EVERY 6 HOURS PRN
Qty: 150 ML | Refills: 0 | Status: SHIPPED | OUTPATIENT
Start: 2022-07-19

## 2022-07-19 RX ORDER — ASPIRIN 325 MG
325 TABLET ORAL
Status: COMPLETED | OUTPATIENT
Start: 2022-07-19 | End: 2022-07-19

## 2022-07-19 RX ORDER — ALBUTEROL SULFATE 90 UG/1
2 AEROSOL, METERED RESPIRATORY (INHALATION)
Status: COMPLETED | OUTPATIENT
Start: 2022-07-19 | End: 2022-07-19

## 2022-07-19 RX ORDER — LIDOCAINE HYDROCHLORIDE 20 MG/ML
10 SOLUTION OROPHARYNGEAL ONCE
Status: COMPLETED | OUTPATIENT
Start: 2022-07-19 | End: 2022-07-19

## 2022-07-19 RX ADMIN — ONDANSETRON 4 MG: 2 INJECTION INTRAMUSCULAR; INTRAVENOUS at 06:07

## 2022-07-19 RX ADMIN — IPRATROPIUM BROMIDE AND ALBUTEROL SULFATE 3 ML: .5; 3 SOLUTION RESPIRATORY (INHALATION) at 07:07

## 2022-07-19 RX ADMIN — METHYLPREDNISOLONE SODIUM SUCCINATE 125 MG: 125 INJECTION, POWDER, FOR SOLUTION INTRAMUSCULAR; INTRAVENOUS at 09:07

## 2022-07-19 RX ADMIN — LIDOCAINE HYDROCHLORIDE 10 ML: 20 SOLUTION ORAL; TOPICAL at 08:07

## 2022-07-19 RX ADMIN — ASPIRIN 325 MG ORAL TABLET 325 MG: 325 PILL ORAL at 06:07

## 2022-07-19 RX ADMIN — IPRATROPIUM BROMIDE AND ALBUTEROL SULFATE 3 ML: 2.5; .5 SOLUTION RESPIRATORY (INHALATION) at 10:07

## 2022-07-19 RX ADMIN — IPRATROPIUM BROMIDE AND ALBUTEROL SULFATE 3 ML: 2.5; .5 SOLUTION RESPIRATORY (INHALATION) at 09:07

## 2022-07-19 RX ADMIN — ALUMINUM HYDROXIDE, MAGNESIUM HYDROXIDE, AND SIMETHICONE 30 ML: 200; 200; 20 SUSPENSION ORAL at 08:07

## 2022-07-19 RX ADMIN — SODIUM CHLORIDE 500 ML: 0.9 INJECTION, SOLUTION INTRAVENOUS at 06:07

## 2022-07-19 RX ADMIN — ALBUTEROL SULFATE 2 PUFF: 90 AEROSOL, METERED RESPIRATORY (INHALATION) at 09:07

## 2022-07-19 NOTE — ED PROVIDER NOTES
Encounter Date: 2022       History     Chief Complaint   Patient presents with    Shortness of Breath     Pt reports SOB and wheezing this afternoon. Hx of COPD. EMS gave 125mg solumedrol IV and 1 duoneb in route to the ED. Pt arrived 100% on duoneb @ 6 liters O2     56-year-old female history of hypertension and COPD who continues to smoke cigarettes but is not on home oxygen presents via EMS.  Patient states she was drinking alcohol today at home.  She states she had some turkey and then started feeling some chest and abdominal pain.  Difficult to describe or rate the pain.  History may be limited but alcohol consumption. States pain associated with nausea, vomiting, and SOB.  Denies blood in emesis or recent blood in stools. Pt received Solumedrol 125mg and a DuoNeb PTA. Now feels no SOB but not quite back to her baseline.         Review of patient's allergies indicates:  No Known Allergies  Past Medical History:   Diagnosis Date    Arthritis     Hypertension      Past Surgical History:   Procedure Laterality Date     SECTION      FRACTURE SURGERY      Left leg      No family history on file.  Social History     Tobacco Use    Smoking status: Current Every Day Smoker     Packs/day: 0.50     Types: Cigarettes    Smokeless tobacco: Never Used   Substance Use Topics    Alcohol use: Yes     Comment: occasionally    Drug use: No     Review of Systems   Constitutional: Negative for fever.   HENT: Negative for sore throat.    Eyes: Negative for visual disturbance.   Respiratory: Positive for chest tightness and shortness of breath.    Cardiovascular: Positive for chest pain. Negative for palpitations.   Gastrointestinal: Positive for abdominal pain, nausea and vomiting. Negative for abdominal distention, anal bleeding, blood in stool, constipation and diarrhea.   Genitourinary: Negative for difficulty urinating and dysuria.   Musculoskeletal: Negative for back pain and neck pain.   Skin: Negative  for rash.   Neurological: Negative for headaches.       Physical Exam     Initial Vitals   BP Pulse Resp Temp SpO2   07/19/22 1638 07/19/22 1638 07/19/22 1638 07/19/22 2107 07/19/22 1638   118/75 80 (!) 22 98.1 °F (36.7 °C) 100 %      MAP       --                Physical Exam    Nursing note and vitals reviewed.  Constitutional: She appears well-developed and well-nourished.   Eyes: EOM are normal. Pupils are equal, round, and reactive to light.   Neck: Neck supple. No thyromegaly present. No JVD present.   Normal range of motion.  Cardiovascular: Normal rate, regular rhythm, normal heart sounds and intact distal pulses. Exam reveals no gallop and no friction rub.    No murmur heard.  Pulmonary/Chest: Breath sounds normal. No respiratory distress. She exhibits no tenderness.   Abdominal: Abdomen is soft. Bowel sounds are normal. There is no abdominal tenderness.   Musculoskeletal:         General: No tenderness or edema. Normal range of motion.      Cervical back: Normal range of motion and neck supple.     Neurological: She is alert and oriented to person, place, and time. She has normal strength.   GCS 14. Opens Eyes to light touch.    Skin: Skin is warm and dry. Capillary refill takes less than 2 seconds.         ED Course   Procedures  Labs Reviewed   CBC W/ AUTO DIFFERENTIAL - Abnormal; Notable for the following components:       Result Value    MCV 79 (*)     MCH 25.9 (*)     RDW 15.7 (*)     Immature Granulocytes 0.6 (*)     Mono # 0.1 (*)     Mono % 2.0 (*)     All other components within normal limits   COMPREHENSIVE METABOLIC PANEL - Abnormal; Notable for the following components:    Potassium 3.1 (*)     CO2 22 (*)     Glucose 127 (*)     BUN 4 (*)     All other components within normal limits   ALCOHOL,MEDICAL (ETHANOL) - Abnormal; Notable for the following components:    Alcohol, Serum 214 (*)     All other components within normal limits   TROPONIN I   TROPONIN I   B-TYPE NATRIURETIC PEPTIDE   LIPASE    SARS-COV-2 RDRP GENE          Imaging Results          X-Ray Chest AP Portable (Final result)  Result time 07/19/22 18:51:32    Final result by Mari Cordoba MD (07/19/22 18:51:32)                 Impression:      No acute intrathoracic abnormality detected.      Electronically signed by: Mari Cordoba  Date:    07/19/2022  Time:    18:51             Narrative:    EXAMINATION:  AP PORTABLE CHEST    CLINICAL HISTORY:  Chest Pain;    TECHNIQUE:  AP portable chest radiograph was submitted.    COMPARISON:  09/19/2021    FINDINGS:  AP portable chest radiograph demonstrates a cardiac silhouette within normal limits.  There is vascular calcifications seen at the aortic knob.  There is tortuosity of the descending thoracic aorta.  Vascular calcification is seen at the aortic knob.  There is no focal consolidation, pneumothorax, or pleural effusion.  A linear plate atelectasis or scarring is seen at the right lung base.  Spondylitic changes are present.                                 Medications   aspirin tablet 325 mg (325 mg Oral Given 7/19/22 1823)   sodium chloride 0.9% bolus 500 mL (0 mLs Intravenous Stopped 7/19/22 1918)   aluminum-magnesium hydroxide-simethicone 200-200-20 mg/5 mL suspension 30 mL (30 mLs Oral Given 7/19/22 2058)     And   LIDOcaine HCl 2% oral solution 10 mL (10 mLs Oral Given 7/19/22 2058)   ondansetron injection 4 mg (4 mg Intravenous Given 7/19/22 1824)   albuterol-ipratropium 2.5 mg-0.5 mg/3 mL nebulizer solution 3 mL (3 mLs Nebulization Given 7/19/22 1933)   albuterol inhaler 2 puff (2 puffs Inhalation Given 7/19/22 2116)   methylPREDNISolone sodium succinate injection 125 mg (125 mg Intravenous Given 7/19/22 2138)   albuterol-ipratropium 2.5 mg-0.5 mg/3 mL nebulizer solution 3 mL (3 mLs Nebulization Given 7/19/22 2204)       11:20- Pt now awake and alert and clinically sober. No SOB. No nausea. No abdominal pain. Repeat exam shows no abdominal ttp. Work up shows no acute  abnormalities. Pt was wheezing but since resolved with treatments. CXR shows no acute abnormalities. Pt reports increased congestion. Will treat with doxycycline and refill albuterol for neb and pump. No steroids due to GERD symptoms and vomiting.                        Clinical Impression:   Final diagnoses:  [R07.9] Chest pain  [R11.10] Vomiting, intractability of vomiting not specified, presence of nausea not specified, unspecified vomiting type (Primary)  [F10.929] Alcoholic intoxication with complication  [J44.9] Chronic obstructive pulmonary disease, unspecified COPD type          ED Disposition Condition    Discharge Stable        ED Prescriptions     Medication Sig Dispense Start Date End Date Auth. Provider    albuterol (PROVENTIL/VENTOLIN HFA) 90 mcg/actuation inhaler Inhale 2 puffs into the lungs every 6 (six) hours as needed for Wheezing or Shortness of Breath. Rescue 18 g 7/19/2022 8/18/2022 Shane Reyes MD    albuterol (PROVENTIL) 2.5 mg /3 mL (0.083 %) nebulizer solution Take 3 mLs (2.5 mg total) by nebulization every 6 (six) hours as needed for Wheezing. 150 mL 7/19/2022  Shane Reyes MD    doxycycline (VIBRAMYCIN) 100 MG Cap Take 1 capsule (100 mg total) by mouth 2 (two) times daily. for 10 days 20 capsule 7/19/2022 7/29/2022 Shane Reyes MD    benzonatate (TESSALON) 100 MG capsule Take 1 capsule (100 mg total) by mouth 3 (three) times daily as needed for Cough. 20 capsule 7/19/2022 7/29/2022 Shane Reyes MD        Follow-up Information     Follow up With Specialties Details Why Contact Info    [facility] - Emergency Dept Emergency Medicine  As needed [address]  [facility] [address] [phone]    Son CAROL Gonsales MD Family Medicine Schedule an appointment as soon as possible for a visit  if you do not have a primary care doctor call this one or the one of your choice. [address] [facility] [id number] [phone]             Shane Reyes  MD  07/19/22 7986

## 2022-07-20 NOTE — ED NOTES
Discharge education/instructions given to pt and FM with prescriptions x 2 sent electronically, to f/u with PCP/return to ER if condition worsens. Denies questions. Pt assisted into WC with daughter present and assisted into back seat of SUV, daughter driving.

## 2023-09-21 ENCOUNTER — HOSPITAL ENCOUNTER (EMERGENCY)
Facility: HOSPITAL | Age: 58
Discharge: HOME | End: 2023-09-22
Attending: EMERGENCY MEDICINE
Payer: MEDICARE

## 2023-09-21 DIAGNOSIS — R55 SYNCOPE, UNSPECIFIED SYNCOPE TYPE: Primary | ICD-10-CM

## 2023-09-21 DIAGNOSIS — E83.42 HYPOMAGNESEMIA: ICD-10-CM

## 2023-09-21 DIAGNOSIS — M25.561 RIGHT KNEE PAIN: ICD-10-CM

## 2023-09-21 DIAGNOSIS — E87.6 HYPOKALEMIA: ICD-10-CM

## 2023-09-21 LAB
ALBUMIN SERPL BCP-MCNC: 4.1 G/DL (ref 3.5–5.2)
ALP SERPL-CCNC: 71 U/L (ref 55–135)
ALT SERPL W/O P-5'-P-CCNC: 17 U/L (ref 10–44)
ANION GAP SERPL CALC-SCNC: 11 MMOL/L (ref 8–16)
AST SERPL-CCNC: 22 U/L (ref 10–40)
BASOPHILS # BLD AUTO: 0.06 K/UL (ref 0–0.2)
BASOPHILS NFR BLD: 0.7 % (ref 0–1.9)
BILIRUB SERPL-MCNC: 0.9 MG/DL (ref 0.1–1)
BNP SERPL-MCNC: <10 PG/ML (ref 0–99)
BUN SERPL-MCNC: 14 MG/DL (ref 6–20)
CALCIUM SERPL-MCNC: 9.3 MG/DL (ref 8.7–10.5)
CHLORIDE SERPL-SCNC: 104 MMOL/L (ref 95–110)
CO2 SERPL-SCNC: 23 MMOL/L (ref 23–29)
CREAT SERPL-MCNC: 0.8 MG/DL (ref 0.5–1.4)
D DIMER PPP IA.FEU-MCNC: 0.4 MG/L FEU
DIFFERENTIAL METHOD: ABNORMAL
EOSINOPHIL # BLD AUTO: 0.2 K/UL (ref 0–0.5)
EOSINOPHIL NFR BLD: 2 % (ref 0–8)
ERYTHROCYTE [DISTWIDTH] IN BLOOD BY AUTOMATED COUNT: 15.4 % (ref 11.5–14.5)
EST. GFR  (NO RACE VARIABLE): >60 ML/MIN/1.73 M^2
GLUCOSE SERPL-MCNC: 85 MG/DL (ref 70–110)
HCT VFR BLD AUTO: 38.9 % (ref 37–48.5)
HGB BLD-MCNC: 12.7 G/DL (ref 12–16)
IMM GRANULOCYTES # BLD AUTO: 0.02 K/UL (ref 0–0.04)
IMM GRANULOCYTES NFR BLD AUTO: 0.2 % (ref 0–0.5)
LYMPHOCYTES # BLD AUTO: 3.5 K/UL (ref 1–4.8)
LYMPHOCYTES NFR BLD: 40.9 % (ref 18–48)
MAGNESIUM SERPL-MCNC: 1.5 MG/DL (ref 1.6–2.6)
MCH RBC QN AUTO: 25.6 PG (ref 27–31)
MCHC RBC AUTO-ENTMCNC: 32.6 G/DL (ref 32–36)
MCV RBC AUTO: 78 FL (ref 82–98)
MONOCYTES # BLD AUTO: 0.7 K/UL (ref 0.3–1)
MONOCYTES NFR BLD: 8.3 % (ref 4–15)
NEUTROPHILS # BLD AUTO: 4.1 K/UL (ref 1.8–7.7)
NEUTROPHILS NFR BLD: 47.9 % (ref 38–73)
NRBC BLD-RTO: 0 /100 WBC
PLATELET # BLD AUTO: 262 K/UL (ref 150–450)
PMV BLD AUTO: 9.8 FL (ref 9.2–12.9)
POTASSIUM SERPL-SCNC: 2.8 MMOL/L (ref 3.5–5.1)
PROT SERPL-MCNC: 7.4 G/DL (ref 6–8.4)
RBC # BLD AUTO: 4.96 M/UL (ref 4–5.4)
SODIUM SERPL-SCNC: 138 MMOL/L (ref 136–145)
TROPONIN I SERPL DL<=0.01 NG/ML-MCNC: <0.006 NG/ML (ref 0–0.03)
WBC # BLD AUTO: 8.63 K/UL (ref 3.9–12.7)

## 2023-09-21 PROCEDURE — 99285 EMERGENCY DEPT VISIT HI MDM: CPT | Mod: 25

## 2023-09-21 PROCEDURE — 80053 COMPREHEN METABOLIC PANEL: CPT | Performed by: EMERGENCY MEDICINE

## 2023-09-21 PROCEDURE — 63600175 PHARM REV CODE 636 W HCPCS: Performed by: EMERGENCY MEDICINE

## 2023-09-21 PROCEDURE — 96361 HYDRATE IV INFUSION ADD-ON: CPT

## 2023-09-21 PROCEDURE — 85025 COMPLETE CBC W/AUTO DIFF WBC: CPT | Performed by: EMERGENCY MEDICINE

## 2023-09-21 PROCEDURE — 25000003 PHARM REV CODE 250: Performed by: EMERGENCY MEDICINE

## 2023-09-21 PROCEDURE — 96365 THER/PROPH/DIAG IV INF INIT: CPT

## 2023-09-21 PROCEDURE — 96367 TX/PROPH/DG ADDL SEQ IV INF: CPT

## 2023-09-21 PROCEDURE — 85379 FIBRIN DEGRADATION QUANT: CPT | Performed by: EMERGENCY MEDICINE

## 2023-09-21 PROCEDURE — 83735 ASSAY OF MAGNESIUM: CPT | Performed by: EMERGENCY MEDICINE

## 2023-09-21 PROCEDURE — 93010 EKG 12-LEAD: ICD-10-PCS | Mod: ,,, | Performed by: INTERNAL MEDICINE

## 2023-09-21 PROCEDURE — 93010 ELECTROCARDIOGRAM REPORT: CPT | Mod: ,,, | Performed by: INTERNAL MEDICINE

## 2023-09-21 PROCEDURE — 96366 THER/PROPH/DIAG IV INF ADDON: CPT

## 2023-09-21 PROCEDURE — 83880 ASSAY OF NATRIURETIC PEPTIDE: CPT | Performed by: EMERGENCY MEDICINE

## 2023-09-21 PROCEDURE — 93005 ELECTROCARDIOGRAM TRACING: CPT

## 2023-09-21 PROCEDURE — 84484 ASSAY OF TROPONIN QUANT: CPT | Performed by: EMERGENCY MEDICINE

## 2023-09-21 RX ORDER — ACETAMINOPHEN 325 MG/1
650 TABLET ORAL
Status: COMPLETED | OUTPATIENT
Start: 2023-09-21 | End: 2023-09-21

## 2023-09-21 RX ORDER — MAGNESIUM SULFATE HEPTAHYDRATE 40 MG/ML
2 INJECTION, SOLUTION INTRAVENOUS ONCE
Status: COMPLETED | OUTPATIENT
Start: 2023-09-21 | End: 2023-09-21

## 2023-09-21 RX ORDER — POTASSIUM CHLORIDE 20 MEQ/1
40 TABLET, EXTENDED RELEASE ORAL
Status: COMPLETED | OUTPATIENT
Start: 2023-09-21 | End: 2023-09-21

## 2023-09-21 RX ORDER — POTASSIUM CHLORIDE 7.45 MG/ML
10 INJECTION INTRAVENOUS
Status: COMPLETED | OUTPATIENT
Start: 2023-09-21 | End: 2023-09-21

## 2023-09-21 RX ADMIN — POTASSIUM CHLORIDE 40 MEQ: 1500 TABLET, EXTENDED RELEASE ORAL at 08:09

## 2023-09-21 RX ADMIN — SODIUM CHLORIDE 1000 ML: 9 INJECTION, SOLUTION INTRAVENOUS at 07:09

## 2023-09-21 RX ADMIN — POTASSIUM CHLORIDE 10 MEQ: 7.46 INJECTION, SOLUTION INTRAVENOUS at 10:09

## 2023-09-21 RX ADMIN — ACETAMINOPHEN 650 MG: 325 TABLET ORAL at 07:09

## 2023-09-21 RX ADMIN — MAGNESIUM SULFATE HEPTAHYDRATE 2 G: 40 INJECTION, SOLUTION INTRAVENOUS at 09:09

## 2023-09-22 VITALS
WEIGHT: 190 LBS | BODY MASS INDEX: 30.67 KG/M2 | RESPIRATION RATE: 16 BRPM | HEART RATE: 74 BPM | DIASTOLIC BLOOD PRESSURE: 92 MMHG | TEMPERATURE: 98 F | OXYGEN SATURATION: 99 % | SYSTOLIC BLOOD PRESSURE: 143 MMHG

## 2023-09-22 NOTE — ED PROVIDER NOTES
"Encounter Date: 2023    SCRIBE #1 NOTE: I, Bambi Shanell, am scribing for, and in the presence of,  Javier Camara MD.       History     Chief Complaint   Patient presents with    Weakness     56 yo female to EMS triage for near syncopal episode. Pt was sitting outside w/ her sister when she became lightheaded, nauseous, and almost passed out. Sister called 911, upon EMS arrival patients BP was "60 palp" and slightly confused. Pt received 300 of L/R , . Pt is now AAOx4, /82.      Dizziness     56 yo F presenting to the ED for lightheadedness, near syncope. PMHx significant for HTN. She was sitting outside on her porch with her sister, when she suddenly felt lightheaded, nauseated, prompting her sister to call EMS. Upon EMS arrival to scene she had sbp 60 with slight confusion. She received 300 cc LR en route, , with improvement. She denies any falls, injuries, trauma. She notes atraumatic R knee pain since the incident. No other exacerbating or alleviating factors. Denies vomiting, dyspnea, dysuria, cough, fever, recent abnormal bleeding, headaches or other associated symptoms.  No personal hx DVT or PE. Denies family hx of aneurysms. No hx of similar sx.     The history is provided by the patient and the EMS personnel.     Review of patient's allergies indicates:  No Known Allergies  Past Medical History:   Diagnosis Date    Arthritis     Hypertension      Past Surgical History:   Procedure Laterality Date     SECTION      FRACTURE SURGERY      Left leg      No family history on file.  Social History     Tobacco Use    Smoking status: Every Day     Current packs/day: 0.50     Types: Cigarettes    Smokeless tobacco: Never   Substance Use Topics    Alcohol use: Yes     Comment: occasionally    Drug use: No     Review of Systems   Constitutional:  Negative for chills and fever.   HENT:  Negative for congestion, rhinorrhea and sore throat.    Eyes:  Negative for visual disturbance. "   Respiratory:  Negative for cough and shortness of breath.    Cardiovascular:  Negative for chest pain.   Gastrointestinal:  Positive for nausea. Negative for abdominal pain, diarrhea and vomiting.   Genitourinary:  Negative for dysuria, frequency and hematuria.   Musculoskeletal:  Positive for arthralgias (R knee). Negative for back pain.   Skin:  Negative for rash.   Neurological:  Positive for light-headedness. Negative for dizziness, syncope, weakness and headaches.       Physical Exam     Initial Vitals [09/21/23 1752]   BP Pulse Resp Temp SpO2   (!) 148/82 78 17 98.1 °F (36.7 °C) 97 %      MAP       --         Physical Exam    Nursing note and vitals reviewed.  Constitutional: She appears well-developed and well-nourished. She does not appear ill. No distress.   HENT:   Head: Normocephalic and atraumatic.   Mouth/Throat: Oropharynx is clear and moist.   Eyes: Conjunctivae and EOM are normal.   EOMI. No nystagmus. Pupils 3 mm round and reactive bilaterally.    Neck:   No carotid bruits.    Normal range of motion.  Cardiovascular:  Normal rate, regular rhythm and normal heart sounds.           No murmur heard.  Pulmonary/Chest: Breath sounds normal. No respiratory distress. She has no wheezes.   Abdominal: Abdomen is soft. There is no abdominal tenderness.   Musculoskeletal:      Cervical back: Normal range of motion.      Comments: No BLE edema. Generalized tenderness to R knee. Tenderness to R calf. 2+ DP to R. No abrasions to BLE. No deformities.      Neurological: She is alert. GCS score is 15.   No facial asymmetry. No drift to BUE. F2N intact bl   Skin: Skin is warm.   Psychiatric: She has a normal mood and affect.         ED Course   Procedures  Labs Reviewed   CBC W/ AUTO DIFFERENTIAL - Abnormal; Notable for the following components:       Result Value    MCV 78 (*)     MCH 25.6 (*)     RDW 15.4 (*)     All other components within normal limits   COMPREHENSIVE METABOLIC PANEL - Abnormal; Notable for the  following components:    Potassium 2.8 (*)     All other components within normal limits   MAGNESIUM - Abnormal; Notable for the following components:    Magnesium 1.5 (*)     All other components within normal limits   TROPONIN I   B-TYPE NATRIURETIC PEPTIDE   D DIMER, QUANTITATIVE   MAGNESIUM          Imaging Results              X-Ray Knee 3 View Right (Final result)  Result time 09/21/23 20:33:43      Final result by Horacio Linder MD (09/21/23 20:33:43)                   Impression:      No acute osseous abnormality identified.  Tricompartmental osteoarthritis.      Electronically signed by: Horacio Linder MD  Date:    09/21/2023  Time:    20:33               Narrative:    EXAMINATION:  XR KNEE 3 VIEW RIGHT    CLINICAL HISTORY:  Pain in right knee    TECHNIQUE:  AP, lateral, and Merchant views of the right knee were performed.    COMPARISON:  None    FINDINGS:  No evidence of acute displaced fracture, dislocation, or osseous destructive process.  There is tricompartmental osteoarthritis.  Degenerative marginal osteophytes are seen most pronounced involving the lateral tibiofemoral compartment.  There is mild medial and lateral compartment joint space narrowing.  No significant suprapatellar joint effusion.                                       Medications   sodium chloride 0.9% bolus 1,000 mL 1,000 mL (0 mLs Intravenous Stopped 9/21/23 2151)   acetaminophen tablet 650 mg (650 mg Oral Given 9/21/23 1940)   potassium chloride SA CR tablet 40 mEq (40 mEq Oral Given 9/21/23 2026)   magnesium sulfate 2g in water 50mL IVPB (premix) (0 g Intravenous Stopped 9/21/23 2300)   potassium chloride 10 mEq in 100 mL IVPB (0 mEq Intravenous Stopped 9/21/23 2336)     Medical Decision Making    57-year-old female presenting following lightheaded episode.  Patient asymptomatic while in the ER.  No arrhythmia noted on ECG.  Patient had potassium of 2.8.  IV and p.o. potassium given.  Magnesium of 1.5.  Magnesium repleted.   Patient appears stable at this time.  Discussed the findings with the patient.  Patient is to follow up with primary care provider for further evaluation.    Amount and/or Complexity of Data Reviewed  Independent Historian: EMS  Labs: ordered. Decision-making details documented in ED Course.  Radiology: ordered.  ECG/medicine tests: ordered and independent interpretation performed. Decision-making details documented in ED Course.    Risk  OTC drugs.  Prescription drug management.            Scribe Attestation:   Scribe #1: I performed the above scribed service and the documentation accurately describes the services I performed. I attest to the accuracy of the note.        ED Course as of 09/22/23 0005   Thu Sep 21, 2023   1921 EKG 12-lead  Time 7:19 a.m.     Rate 78, sinus, regular rhythm, axis.    .  No ST elevation or depression.  T-wave inversion V2.  No hyperacute T-waves.  No Q-waves present PVCs present.    Normal sinus rhythm with occasional PVCs and nonspecific T-wave inversion. [JM]   2215 D-Dimer: 0.40 [JM]   2215 Hemoglobin: 12.7 [JM]      ED Course User Index  [JM] Javier Camara MD                  I, Javier Camara, personally performed the services described in this documentation. All medical record entries made by the scribe were at my direction and in my presence. I have reviewed the chart and agree that the record reflects my personal performance and is accurate and complete.    Clinical Impression:   Final diagnoses:  [M25.561] Right knee pain  [R55] Syncope, unspecified syncope type (Primary)  [E87.6] Hypokalemia  [E83.42] Hypomagnesemia        ED Disposition Condition    Discharge Stable          ED Prescriptions    None       Follow-up Information       Follow up With Specialties Details Why Contact Info    [facility]  Schedule an appointment as soon as possible for a visit  Primary care [address] [facility] [id number]    [facility] [place] [facility]  Dept Emergency Medicine  If symptoms worsen [address]  [facility] [address]  [phone]             Javier Camara MD  09/22/23 0005

## 2025-04-02 ENCOUNTER — TELEPHONE (OUTPATIENT)
Dept: SPEECH THERAPY | Facility: HOSPITAL | Age: 60
End: 2025-04-02
Payer: MEDICARE

## 2025-04-02 DIAGNOSIS — R13.12 OROPHARYNGEAL DYSPHAGIA: Primary | ICD-10-CM

## 2025-04-02 DIAGNOSIS — I69.354 HEMIPLEGIA AND HEMIPARESIS FOLLOWING CEREBRAL INFARCTION AFFECTING LEFT NON-DOMINANT SIDE: ICD-10-CM

## 2025-04-02 NOTE — TELEPHONE ENCOUNTER
Hanny chaudhari at [facility] to inform pt scheduled for mbss 4/17@9am.----- Message from Albert Redman sent at 3/31/2025  2:01 PM CDT -----  Regarding: referral status  PATIENT Sabiha from [facility] called regarding open referral, sent over on Friday 03/28. Would just like to make sure that it has been received. Please call back at [phone] if there are any addl questions

## 2025-04-17 ENCOUNTER — HOSPITAL ENCOUNTER (OUTPATIENT)
Dept: RADIOLOGY | Facility: HOSPITAL | Age: 60
Discharge: HOME | End: 2025-04-17
Payer: MEDICARE

## 2025-04-17 ENCOUNTER — CLINICAL SUPPORT (OUTPATIENT)
Dept: SPEECH THERAPY | Facility: HOSPITAL | Age: 60
End: 2025-04-17
Payer: MEDICARE

## 2025-04-17 ENCOUNTER — TELEPHONE (OUTPATIENT)
Dept: SPEECH THERAPY | Facility: HOSPITAL | Age: 60
End: 2025-04-17

## 2025-04-17 DIAGNOSIS — I69.354 HEMIPLEGIA AND HEMIPARESIS FOLLOWING CEREBRAL INFARCTION AFFECTING LEFT NON-DOMINANT SIDE: ICD-10-CM

## 2025-04-17 DIAGNOSIS — R13.12 OROPHARYNGEAL DYSPHAGIA: ICD-10-CM

## 2025-04-17 PROCEDURE — 92610 EVALUATE SWALLOWING FUNCTION: CPT | Performed by: SPEECH-LANGUAGE PATHOLOGIST

## 2025-04-17 PROCEDURE — 92611 MOTION FLUOROSCOPY/SWALLOW: CPT | Performed by: SPEECH-LANGUAGE PATHOLOGIST

## 2025-04-17 PROCEDURE — 92526 ORAL FUNCTION THERAPY: CPT | Performed by: SPEECH-LANGUAGE PATHOLOGIST

## 2025-04-17 PROCEDURE — A9698 NON-RAD CONTRAST MATERIALNOC: HCPCS

## 2025-04-17 PROCEDURE — 25500020 PHARM REV CODE 255

## 2025-04-17 PROCEDURE — 74230 X-RAY XM SWLNG FUNCJ C+: CPT | Mod: TC

## 2025-04-17 RX ADMIN — BARIUM SULFATE 60 ML: 0.81 POWDER, FOR SUSPENSION ORAL at 09:04

## 2025-04-17 NOTE — PROGRESS NOTES
See Modified Barium Swallow Study (MBSS) in plan of care.     Beatriz Kent MA, L-SLP, CCC-SLP  Speech Language Pathologist  4/17/2025

## 2025-04-17 NOTE — PLAN OF CARE
[facility]   Outpatient MODIFIED BARIUM SWALLOW STUDY    Date: 4/17/2025     Name: Jayde Kinney   MRN: [id number]    Therapy Diagnosis: mild oropharyngeal dysphagia    Physician: Order, Paper  Physician Orders: Fl Modified Barium Swallow Speech/SLP Video Swallow  Medical Diagnosis from Referral: Hemiplegia and hemiparesis following cerebral infarction affecting left non-dominant side [I69.354], Oropharyngeal dysphagia [R13.12]     Date of Evaluation:  4/17/2025    Procedure Min.   Swallow and Oral Function Evaluation   10   Fl Modified Barium Swallow Speech  10   Dysphagia Therapy   10     Time in: 9:00 AM  Time out: 9:30 AM  Total Billable Time: 30 minutes    Radiation time: 2.9 minutes    Precautions: Standard, Fall, Cognition, Status post CVA, and Reduced health literacy   Subjective   History of Current Condition: Jayde Kinney is a 59 y.o. female here today for Modified Barium Swallow Study (MBSS). Patient presents via EMS transportation; limited case history available for review. She is currently a resident at [facility]. 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 She reports she has been NPO since CVA with all nutrition received via PEG tube. Unclear patient's cognitive-linguistic status or reliability of history presentation; however, she reports she has not been receiving speech therapy services. She denies overt signs of aspiration, globus sensation, or recent PNA. She denies history of GERD.     In consideration of the interrelationships between body systems and swallowing, History was provided by patient, and/or taken from chart review. The following are medical conditions present in the patient's history which can result in or be attributed to dysphagia:  Respiratory Chronic Obstructive  "Pulmonary Disease (COPD)   Cardiovascular Hyperlipidemia  Hypertension    Digestive None noted in this category   Infections  None noted in this category   Urinary None noted in this category   Endocrine None noted in this category   Nervous cerebrovascular accident (CVA)    Skeletal None noted in this category   Immune None noted in this category   Cancer None noted in this category   Psychiatric  None noted in this category   Congenital  None noted in this category   Other None noted in this category     -Current diet at home: NPO with PEG tube dependence   -Recommended diet from previous study: records unavailable for review  -Therapy received: unclear/unknown     The following observations were made:   -Mental status: Alert and Cooperative  -Factors affecting performance: impairment or difficulty noted in endurance  -Feeding Method: dependent for feeding    Respiratory Status:   -Respiratory Status: room air    Past Medical History: Jayde Kinney  has a past medical history of Arthritis and Hypertension.  Jayde Kinney  has a past surgical history that includes  section and Fracture surgery.    Medical Hx and Allergies: Review of patient's allergies indicates:  No Known Allergies    Relevant Imaging:  Chest XRAY, 24:  FINDINGS - XR CHEST:    Chest portable.   Patient is obliquely oriented.    Cardiac silhouette is not enlarged.   Mild left basilar atelectasis.    Mild degenerative changes of the thoracic spine.       Objective     Modified Barium Swallow Study  Purpose: to evaluate anatomy and physiology of the oropharyngeal swallow, to determine effectiveness of rehabilitation strategies, and to determine diet consistency and intervention recommendations. The study was performed using the "Gold Standard" of 30 fps with as low as reasonably achievable (ALARA) exposure.     The patient was seen in radiology seated in High Jaimes's position in a video imaging chair for lateral views of the larynx. " The study was conducted using Varibar thin liquid (IDDSI 0), Varibar nectar liquid (IDDSI 2), Varibar pudding (IDDSI 4), Peaches mixed with Varibar thin liquid (IDDSI 6/0), and solid coated in Varibar pudding (IDDSI 7). She tolerated the procedure well.    A cranial nerve evaluation revealed:   Cranial Nerve Examination  Cranial Nerve 5: Trigeminal Nerve  Motor Jaw Posture at rest: Closed  Mandible Elevation/Depression: WFL  Mandible lateralization: WFL  Abnormal movement: absent Interpretation: Intact bilaterally    Sensory Forehead: WFL  Cheek: WFL  Jaw: WFL  Facial Pain: None noted Interpretation: Intact bilaterally      Cranial Nerve 7: Facial Nerve  Motor Facial Symmetry: WNL  Wrinkle Forehead: WFL  Close eyes tightly: WFL  Labial Protrusion: mildly reduced R  Labial Retraction: mildly reduced R  Buccal Strength with Labial Seal: mild reduced R  Abnormal movement: absent Interpretation: consistent with L RADHA CVA   Sensory Formal testing not completed.       Cranial Nerves IX and X: Glossopharyngeal and Vagus Nerves  Motor Palatal Symmetry (Rest): WNL  Palatal Symmetry (Movement): WNL  Cough: Perceptually strong  Voice Prior to PO intake: Clear  Resonance: Normal  Abnormal movement: absent Interpretation: Intact bilaterally      Cranial Nerve XII: Hypoglossal Nerve  Motor Tongue at rest: WNL  Lingual Protrusion: mildly reduced  Lingual Protrusion against Resistance: mildly reduced   Lingual Lateralization: mildly reduced R  Abnormal movement: absent Interpretation: consistent with L RADHA CVA     Other information:  Volitional Swallow: Able to palpate laryngeal rise  Mucosal Quality: No abnormal findings  Secretion Management: no overt deficits noted/observed  Dentition: Scattered Dentition        Consistency  Presentation  Findings Strategy Attempted Rosenbeck's Penetration/Aspiration Scale (PAS)   Thin (IDDSI 0) Method: Self-fed    Volume: cup sip x5,  sequential sips    Projection: lateral view Oral phase:  trace anterior loss from R corner without retrieval     Pharyngeal phase: no penetration or aspiration observed. Trace base of tongue and pyriform sinus residue.     Esophageal screen: in lateral view, trace-minimal residue retained superior and interior to suspected cricopharyngeal bar +/- Zenker's diverticulum with trace retrograde flow back into pyriform sinuses after the swallow which clears with spontaneous sequential swallow  Best: (1) Material does not enter the airway    Worst: (1) Material does not enter the airway     Nectar thick (mildly thick/IDDSI 2) Method:Self-fed    Volume: cup sip x2     Projection: lateral view Oral phase: trace anterior loss from R corner without retrieval     Pharyngeal phase: no penetration or aspiration observed. Trace base of tongue residue clears with spontaneous sequential swallow    Best: (1) Material does not enter the airway    Worst: (1) Material does not enter the airway     Puree (extremely thick/ IDDSI 4) Method: Self-fed    Volume: tsp bite x2     Projection: lateral view Oral phase: trace oral residue clears with spontaneous sequential swallow. Prolonged time to complete AP transport of bolus    Pharyngeal phase: No penetration, aspiration or significant pharyngeal residue observed     Best: (1) Material does not enter the airway    Worst: (1) Material does not enter the airway     Solid (regular/ IDDSI 7) Method: Self-fed    Volume: bite x2    Projection: lateral view Oral phase: slightly prolonged mastication time, may be related to patient's dentition status     Pharyngeal phase: no penetration, aspiration observed. Trace base of tongue residue clears with spontaneous sequential swallow.    Esophageal screen: in lateral view, trace-minimal residue observed superior and inferior to suspected cricopharyngeal bar +/- Zenker's diverticulum with trace retrograde flow back into pyriform sinuses after the swallow which clears with spontaneous sequential swallow  Best:  (1) Material does not enter the airway    Worst: (1) Material does not enter the airway     Mixed consistency (thin/ IDDSI 0 + soft and bite sized/ IDDSI 6) Method: Self-fed    Volume: bite x2     Projection: lateral view Oral phase: mildly prolonged mastication time.     Pharyngeal phase: no penetration, aspiration, or significant pharyngeal residue.   Best: (1) Material does not enter the airway    Worst: (1) Material does not enter the airway     Barium tablet  Method: Self-fed    Volume: single tablet with water bolus(es)    Projection: lateral view Timely and efficient oropharyngeal clearance. Tablet remains lodged inferior to suspected cricopharyngeal bar +/- Zenker's diverticulum in cervical esophagus. Does not clear with subsequent water boluses. Tablet ultimately clears with presentation of subsequent puree boluses.          Treatment   Treatment Time In: 9:20 AM  Treatment Time Out: 9:30 AM  Total Treatment Time: 10 mins  Patient educated regarding results and recommendations of the evaluation. See the recommendations section below.    Education: Plan of Care, role of SLP in care, aspiration precautions, diet recommendations, and anatomy and physiology of swallow mechanism as it relates to MBSS findings and recommendations were discussed with the patient. Patient expressed understanding. All questions were answered.     Assessment     Jayde Kinney is a 59 y.o. female referred for Modified Barium Swallow Study with a medical diagnosis of Hemiplegia and hemiparesis following cerebral infarction affecting left non-dominant side [I69.354], Oropharyngeal dysphagia [R13.12]. The patient presents with mild oropharyngeal dysphagia as determined by the Dysphagia Outcome and Severity Scale (JACOB). Level 5: Mild Dysphagia.    Modified Barium Swallow Study (MBSS) revealed oral phase characterized by mildly reduced lingual and labial strength and range of motion for tongue control, bolus preparation and transport.  Lip closure was reduced with escape to mid-chin. Bolus prep and mastication was prolonged with complete recollection. Lingual motion was brisk for adequate bolus transport. There was trace oral residue on thin and nectar-thick liquids which was eliminated with spontaneous sequential swallow. The swallow was initiated when the head of the bolus passed the ramus of the mandible.    Pharyngeal phase characterized by timely initiation of swallow across consistencies. The soft palate elevated for complete closure of the velopharyngeal port. During pharyngeal swallow, very mildly reduced base of tongue retraction, anterior hyoid excursion, laryngeal elevation, and pharyngeal stripping wave resulted in complete epiglottic inversion and UES opening with  trace residue at the base of tongue and pyriform sinuses which was cleared with spontaneous sequential swallow. No penetration or aspiration was observed in today's study.     Modified Robust Esophageal Screening Test (REST) was used to screen esophageal phase of swallow (Sandra et al, 2021) in today's study, completed in lateral view secondary to positioning restrictions, with intake of puree, self-regulated sips of liquid, and barium tablet. Screening significant for anatomic abnormality (suspected cricopharyngeal bar +/- Zenker's diverticulum. See images below) with retention of barium tablet >1 minute, deviation of straight esophageal contour/deviation of liquid bolus flow, and retrograde flow of bolus through the UES  noted. Further esophageal imaging including esophagogastroduodenoscopy (EGD) as well as follow-up with GI is recommended.      Impressions: Patient presents with mild oropharyngeal dysphagia, likely chronic and related to several factors: primarily, history of CVA with prolonged NPO status/PEG tube dependence resulting in mild R oral motor weakness and mild pharyngeal weakness related to disuse. Patient is independently compensating for mild weakness with  spontaneous sequential swallows. Additionally, she presents with significant esophageal findings (suspected anatomic abnormality) resulting in retention across consistencies within cervical esophagus. Recommend further evaluation/imaging with GI. Recommend initiation of regular diet with thin liquids. Patient was educated regarding findings and recommendations for initiation of oral diet as well as discussion with GI regarding PEG tube removal once she has initiated diet with maintenance of weight. In consideration of the Dynamic Imaging Grade of Swallowing Toxicity (DIGEST) (Isai et al, 2017), patient presents with preserved safety of swallow and mild efficiency impairment. Patient appears to be at low risk for aspiration related PNA in consideration of three pillars of aspiration pneumonia (Anupam, 2005) including oral health status, overall health/immune status, and laryngeal vestibule closure/severity of dysphagia. However, unable to assess risk related to aspiration pneumonia cause by the aspiration of gastric content. Patient appears to be a fair candidate for behavioral swallow rehabilitation.     Functional Oral Intake Scale (FOIS)  The Functional Oral Intake Scale (FOIS) is an ordinal scale that is used to assess the current status and meaningful change in the oral intake. FOIS levels include:    TUBE DEPENDENT (levels 1-3) 1. No oral intake  2. Tube dependent with minimal/inconsistent oral intake  3. Tube supplements with consistent oral intake      TOTAL ORAL INTAKE (levels 4-7) 4. Total oral intake of a single consistency  5. Total oral intake of multiple consistencies requiring special preparation  6. Total oral intake with no special preparation, but must avoid specific foods or liquid items  7. Total oral intake with no restrictions     Patient is currently judged to be at FOIS level 6.      References:  GENI Bo (2005, March). Pneumonia: Factors Beyond Aspiration. Perspectives in  Swallowing and Swallowing Disorders (Dysphagia), 14, 10-16.  Santos KA, Shen MP, Abner DA, Oneyda JK, Ana HY, Kassidy RS, Salvador CD, Bridger SY, Claudio CP, Zuhair J, Lazarus CL, May A, Rojelio J, Phill JW, Oren HM, Shelton JS. Dynamic Imaging Grade of Swallowing Toxicity (DIGEST): Scale development and validation. Cancer. 2017 Jan 1;123(1):62-70. doi: 10.1002/cncr.87363. Epub 2016 Aug 26. PMID: 98201184; PMCID: [id number].  ALIX Flores., GENI Wilkinson, LARRY Kelly, & EJSUS Hein. (2021). Diagnostic Accuracy of an Esophageal Screening Protocol Interpreted by the Speech-Language Pathologist. Dysphagia, 36(6), 0629-2064. [email or web address]    Recommendations:     Consistency Recommendations:  Thin liquids (IDDSI 0) and Regular consistencies (IDDSI 7).  Medications should be taken crushed (when possible) in puree.   Risk Management/Swallow Guidelines: use good oral hygiene, sit upright for all PO intake, increase physical mobility as tolerated, multiple swallows per bolus, and remain upright for at least 2-3 hours following any PO intake  Specialist Referrals: GI  Ancillary Tests: Consider EGD   Therapy: Dysphagia therapy is not recommended at this time.  Follow-up exam: Follow up swallow study is not indicated at this time.    Please contact [facility] at [phone] if there are questions re: the above or if we can be of additional service to this patient.    Beatriz Kent MA, L-SLP, CCC-SLP  Speech Language Pathologist  4/17/2025

## 2025-04-17 NOTE — TELEPHONE ENCOUNTER
Returning Gloria's call she states she provided results to NP and the NP would like to talk with Beatriz before discontinuing the peg tube.  Carolyn VENTURA [phone] at [facility].     ---- Message from Jacquelyn sent at 4/17/2025  1:36 PM CDT -----  Regarding: pt advice  .Type:  Needs Medical AdviceWho Called: Gloria schwartz/[facility] (please be specific): asking to speak to provider in regards to, will discuss when she calls  Would the patient rather a call back or a response via [facility]? Call Best Call Back Number: [phone] Information: n/a

## 2025-06-12 ENCOUNTER — HOSPITAL ENCOUNTER (EMERGENCY)
Facility: HOSPITAL | Age: 60
Discharge: HOME | End: 2025-06-12
Attending: STUDENT IN AN ORGANIZED HEALTH CARE EDUCATION/TRAINING PROGRAM
Payer: MEDICARE

## 2025-06-12 VITALS
BODY MASS INDEX: 30.67 KG/M2 | DIASTOLIC BLOOD PRESSURE: 88 MMHG | RESPIRATION RATE: 14 BRPM | SYSTOLIC BLOOD PRESSURE: 150 MMHG | TEMPERATURE: 98 F | HEART RATE: 72 BPM | WEIGHT: 190 LBS | OXYGEN SATURATION: 97 %

## 2025-06-12 DIAGNOSIS — L89.159 PRESSURE INJURY OF SKIN OF SACRAL REGION, UNSPECIFIED INJURY STAGE: ICD-10-CM

## 2025-06-12 DIAGNOSIS — R31.9 URINARY TRACT INFECTION WITH HEMATURIA, SITE UNSPECIFIED: Primary | ICD-10-CM

## 2025-06-12 DIAGNOSIS — N39.0 URINARY TRACT INFECTION WITH HEMATURIA, SITE UNSPECIFIED: Primary | ICD-10-CM

## 2025-06-12 DIAGNOSIS — M24.50 CONTRACTURE OF JOINT OF MULTIPLE SITES: ICD-10-CM

## 2025-06-12 DIAGNOSIS — Z86.73 HISTORY OF CVA (CEREBROVASCULAR ACCIDENT): ICD-10-CM

## 2025-06-12 LAB
BACTERIA #/AREA URNS AUTO: ABNORMAL /HPF
BILIRUB UR QL STRIP.AUTO: NEGATIVE
CLARITY UR: ABNORMAL
COLOR UR AUTO: ABNORMAL
GLUCOSE UR QL STRIP: NEGATIVE
HGB UR QL STRIP: ABNORMAL
HOLD SPECIMEN: NORMAL
HYALINE CASTS UR QL AUTO: 0 /LPF (ref 0–1)
KETONES UR QL STRIP: ABNORMAL
LEUKOCYTE ESTERASE UR QL STRIP: ABNORMAL
MICROSCOPIC COMMENT: ABNORMAL
NITRITE UR QL STRIP: NEGATIVE
PH UR STRIP: 7 [PH]
PROT UR QL STRIP: ABNORMAL
RBC #/AREA URNS AUTO: >100 /HPF (ref 0–4)
SP GR UR STRIP: 1.02
UROBILINOGEN UR STRIP-ACNC: 1 EU/DL
WBC #/AREA URNS AUTO: >100 /HPF (ref 0–5)
WBC CLUMPS UR QL AUTO: ABNORMAL

## 2025-06-12 PROCEDURE — 81003 URINALYSIS AUTO W/O SCOPE: CPT | Performed by: NURSE PRACTITIONER

## 2025-06-12 PROCEDURE — 99283 EMERGENCY DEPT VISIT LOW MDM: CPT

## 2025-06-12 PROCEDURE — 87186 SC STD MICRODIL/AGAR DIL: CPT | Performed by: NURSE PRACTITIONER

## 2025-06-12 PROCEDURE — 25000003 PHARM REV CODE 250: Performed by: NURSE PRACTITIONER

## 2025-06-12 RX ORDER — OXYCODONE AND ACETAMINOPHEN 5; 325 MG/1; MG/1
1 TABLET ORAL
Refills: 0 | Status: COMPLETED | OUTPATIENT
Start: 2025-06-12 | End: 2025-06-12

## 2025-06-12 RX ORDER — CARVEDILOL 6.25 MG/1
6.25 TABLET ORAL 2 TIMES DAILY WITH MEALS
COMMUNITY

## 2025-06-12 RX ORDER — NAPROXEN SODIUM 220 MG/1
81 TABLET, FILM COATED ORAL DAILY
COMMUNITY

## 2025-06-12 RX ORDER — MORPHINE SULFATE 4 MG/ML
4 INJECTION, SOLUTION INTRAMUSCULAR; INTRAVENOUS
Status: DISCONTINUED | OUTPATIENT
Start: 2025-06-12 | End: 2025-06-12

## 2025-06-12 RX ORDER — POLYVINYL ALCOHOL 14 MG/ML
1 SOLUTION/ DROPS OPHTHALMIC
COMMUNITY

## 2025-06-12 RX ORDER — CEPHALEXIN 250 MG/1
500 CAPSULE ORAL
Status: COMPLETED | OUTPATIENT
Start: 2025-06-12 | End: 2025-06-12

## 2025-06-12 RX ORDER — ERGOCALCIFEROL 1.25 MG/1
50000 CAPSULE ORAL
COMMUNITY

## 2025-06-12 RX ORDER — ONDANSETRON 4 MG/1
4 TABLET, ORALLY DISINTEGRATING ORAL
Status: DISCONTINUED | OUTPATIENT
Start: 2025-06-12 | End: 2025-06-12

## 2025-06-12 RX ORDER — CEPHALEXIN 500 MG/1
500 CAPSULE ORAL EVERY 6 HOURS
Qty: 40 CAPSULE | Refills: 0 | Status: SHIPPED | OUTPATIENT
Start: 2025-06-12 | End: 2025-06-22

## 2025-06-12 RX ORDER — ATORVASTATIN CALCIUM 80 MG/1
80 TABLET, FILM COATED ORAL DAILY
COMMUNITY

## 2025-06-12 RX ORDER — MULTIVITAMIN
1 TABLET ORAL DAILY
COMMUNITY

## 2025-06-12 RX ORDER — AMLODIPINE BESYLATE 10 MG/1
10 TABLET ORAL DAILY
COMMUNITY

## 2025-06-12 RX ADMIN — CEPHALEXIN 500 MG: 250 CAPSULE ORAL at 02:06

## 2025-06-12 RX ADMIN — OXYCODONE HYDROCHLORIDE AND ACETAMINOPHEN 1 TABLET: 5; 325 TABLET ORAL at 02:06

## 2025-06-12 NOTE — ED TRIAGE NOTES
"Pt BIB EMS from home for evaluation of foul smelling urine, AMS, and dysuria x 4 days. Per daughter (caregiver) pt has been "saying things that don't make sense". Pt is bed bound, incontinent, and contracted to her lower extremities. Pt also has a pressure wound to her sacral/buttock fold that daughter reports is being treated at home by wound care nurse. Pt also has a small healing wound to her left abdomen s/p removal of a PEG tube. Other pmhx including HTN, arthritis, and prev stroke with deficits resulting in hemiplegia.    "

## 2025-06-12 NOTE — ED PROVIDER NOTES
Encounter Date: 2025       History     Chief Complaint   Patient presents with    Urinary Tract Infection     Pt arrived via ems,pt chief complaint is a possible UTI. Pt states has been having painful urination for past 3-4 days.      Patient is a 59-year-old female who was just released from the [facility].  She presents by EMS today for urinary tract infection.  She endorses dysuria but denies vaginal bleeding discharge frequency urgency hematuria and does not know she has had fever.  Patient has a history of contracture secondary to CVA.    The history is provided by the patient. No  was used.     Review of patient's allergies indicates:  No Known Allergies  Past Medical History:   Diagnosis Date    Arthritis     Chronic pain syndrome     COPD (chronic obstructive pulmonary disease)     CVA (cerebral vascular accident)     Dry eye     Dysphagia     Expressive aphasia     Hemiplegia     HLP (hyperkeratosis lenticularis perstans)     Hypertension     Moderate protein-calorie malnutrition     Pressure ulcer with suspected deep tissue injury     SVT (supraventricular tachycardia)      Past Surgical History:   Procedure Laterality Date     SECTION      FRACTURE SURGERY      Left leg      No family history on file.  Social History[1]  Review of Systems   Genitourinary:  Positive for dysuria.       Physical Exam     Initial Vitals [25 1305]   BP Pulse Resp Temp SpO2   (!) 150/88 74 20 97.6 °F (36.4 °C) 96 %      MAP       --         Physical Exam    Nursing note and vitals reviewed.  Constitutional: She appears well-developed and well-nourished.   HENT:   Head: Normocephalic and atraumatic.   Right Ear: External ear normal.   Left Ear: External ear normal.   Nose: Nose normal.   Eyes: Conjunctivae and EOM are normal. Pupils are equal, round, and reactive to light. Right eye exhibits no discharge. Left eye exhibits no discharge.   Neck:   Normal range of motion.  Abdominal:  Abdomen is soft. Bowel sounds are normal. She exhibits no distension. There is no abdominal tenderness.   Musculoskeletal:         General: Normal range of motion.      Cervical back: Normal range of motion.     Neurological: She is alert and oriented to person, place, and time.   Skin: Skin is dry. Capillary refill takes less than 2 seconds.         ED Course   Procedures  Labs Reviewed   CULTURE, URINE - Abnormal       Result Value    Urine Culture >100,000 cfu/ml Gram-negative Rods (*)    URINALYSIS, REFLEX TO URINE CULTURE - Abnormal    Color, UA Brown (*)     Appearance, UA Cloudy (*)     pH, UA 7.0      Spec Grav UA 1.020      Protein, UA 3+ (*)     Glucose, UA Negative      Ketones, UA 1+ (*)     Bilirubin, UA Negative      Blood, UA 3+ (*)     Nitrites, UA Negative      Urobilinogen, UA 1.0      Leukocyte Esterase, UA 3+ (*)    URINALYSIS MICROSCOPIC - Abnormal    RBC, UA >100 (*)     WBC, UA >100 (*)     WBC Clumps, UA Many (*)     Bacteria, UA Few (*)     Hyaline Casts, UA 0      Microscopic Comment       GREY TOP URINE HOLD    Extra Tube Hold for add-ons.            Imaging Results    None          Medications   oxyCODONE-acetaminophen 5-325 mg per tablet 1 tablet (1 tablet Oral Given 6/12/25 1439)   cephALEXin capsule 500 mg (500 mg Oral Given 6/12/25 1439)     Medical Decision Making  Patient is a 59-year-old female who was just released from the [facility].  She presents by EMS today for urinary tract infection.  She endorses dysuria but denies vaginal bleeding discharge frequency urgency hematuria and does not know she has had fever.  Patient has a history of contracture secondary to CVA.    On physical exam the patient is in low Jaimes's position contraction in her left hand side.  The abdomen is soft and nontender there is no CVA tenderness.  She has a large resolving pressure sore to her sacrum.  No redness or warmth is noted.  No exudate.    History of present illness:  UTI, urosepsis,  wound infection    Problems Addressed:  Contracture of joint of multiple sites: chronic illness or injury  History of CVA (cerebrovascular accident): chronic illness or injury     Details: Receives home health.  Pressure injury of skin of sacral region, unspecified injury stage: chronic illness or injury     Details: Patient is already with wound care at home.  Urinary tract infection with hematuria, site unspecified: acute illness or injury     Details: Pus for UTI.  Keflex prescribed.  First dose given in the ED patient discharged.    Amount and/or Complexity of Data Reviewed  Labs:  Decision-making details documented in ED Course.  Discussion of management or test interpretation with external provider(s): Vital signs at the time of disposition were:  BP (!) 150/88 (BP Location: Left arm)   Pulse 72   Temp 97.6 °F (36.4 °C) (Oral)   Resp 14   Wt 86.2 kg (190 lb)   SpO2 97%   BMI 30.67 kg/m²       See AVS for additional recommendations. Medications listed herein were prescribed after reviewing the patient's allergies, medication list, history, most recent laboratories as available.  Referrals below were provided after reviewing the patient's previous medical providers. She understands she  should return for any worsening or changes in condition.  Prior to discharge the patient was asked if she  had any additional concerns or complaints and she declined. The patient was given an opportunity to ask questions and all were answered to her satisfaction.     Risk  Prescription drug management.  Diagnosis or treatment significantly limited by social determinants of health.               ED Course as of 06/13/25 1820   Thu Jun 12, 2025   1323 BP(!): 150/88 [VC]   1323 Temp: 97.6 °F (36.4 °C) [VC]   1323 Temp Source: Oral [VC]   1323 Pulse: 74 [VC]   1323 Resp: 20 [VC]   1323 SpO2: 96 % [VC]   1422 Pulse: 85 [VC]   1426 Urinalysis Microscopic(!)  Pos for uti, will treat and culture. [VC]   1453 Resp: 20 [VC]      ED  Course User Index  [VC] Tristen Bear DNP                           Clinical Impression:  Final diagnoses:  [N39.0, R31.9] Urinary tract infection with hematuria, site unspecified (Primary)  [L89.159] Pressure injury of skin of sacral region, unspecified injury stage  [Z86.73] History of CVA (cerebrovascular accident)  [M24.50] Contracture of joint of multiple sites          ED Disposition Condition    Discharge Stable          ED Prescriptions       Medication Sig Dispense Start Date End Date Auth. Provider    cephALEXin (KEFLEX) 500 MG capsule Take 1 capsule (500 mg total) by mouth every 6 (six) hours. for 10 days 40 capsule 6/12/2025 6/22/2025 Tristen Bear DNP          Follow-up Information       Follow up With Specialties Details Why Contact Info    Feng Coppola MD Internal Medicine Schedule an appointment as soon as possible for a visit   [address] [place] [address]  [id number]  [facility] [id number]  [phone]                     [1]   Social History  Tobacco Use    Smoking status: Every Day     Current packs/day: 0.50     Types: Cigarettes    Smokeless tobacco: Never   Substance Use Topics    Alcohol use: Yes     Comment: occasionally    Drug use: No        Tristen Bear DNP  06/13/25 1829

## 2025-06-12 NOTE — ED NOTES
[facility] transport personnel on site to  pt. Discharge instructions reviewed with pt's daughter via phone and [facility] Pt's daughter updated on ETA home.

## 2025-06-13 ENCOUNTER — RESULTS FOLLOW-UP (OUTPATIENT)
Dept: EMERGENCY MEDICINE | Facility: HOSPITAL | Age: 60
End: 2025-06-13

## 2025-06-14 LAB
BACTERIA UR CULT: ABNORMAL
BACTERIA UR CULT: ABNORMAL

## 2025-07-01 ENCOUNTER — TELEPHONE (OUTPATIENT)
Dept: HOME HEALTH SERVICES | Facility: CLINIC | Age: 60
End: 2025-07-01
Payer: MEDICAID

## 2025-07-01 ENCOUNTER — PATIENT MESSAGE (OUTPATIENT)
Dept: HOME HEALTH SERVICES | Facility: CLINIC | Age: 60
End: 2025-07-01
Payer: MEDICAID

## 2025-07-01 DIAGNOSIS — I63.9 CEREBROVASCULAR ACCIDENT (CVA), UNSPECIFIED MECHANISM: ICD-10-CM

## 2025-07-01 DIAGNOSIS — I10 ESSENTIAL HYPERTENSION: Primary | ICD-10-CM

## 2025-07-01 NOTE — TELEPHONE ENCOUNTER
Contacted pt daughter Chichi to schedule an appointment regarding a referral placed for a medical home visit with a nurse practitioner.    Patient has been scheduled

## 2025-07-01 NOTE — TELEPHONE ENCOUNTER
Received request for [facility] appointment with NP from [facility]Amy Calix RN .  Referral placed and patient will be scheduled.